# Patient Record
Sex: FEMALE | Race: WHITE | NOT HISPANIC OR LATINO
[De-identification: names, ages, dates, MRNs, and addresses within clinical notes are randomized per-mention and may not be internally consistent; named-entity substitution may affect disease eponyms.]

---
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
## 2021-06-19 ENCOUNTER — APPOINTMENT (OUTPATIENT)
Dept: DISASTER EMERGENCY | Facility: OTHER | Age: 21
End: 2021-06-19

## 2021-07-10 ENCOUNTER — APPOINTMENT (OUTPATIENT)
Dept: DISASTER EMERGENCY | Facility: OTHER | Age: 21
End: 2021-07-10

## 2021-08-10 ENCOUNTER — APPOINTMENT (OUTPATIENT)
Dept: DISASTER EMERGENCY | Facility: OTHER | Age: 21
End: 2021-08-10

## 2021-08-11 ENCOUNTER — TRANSCRIPTION ENCOUNTER (OUTPATIENT)
Age: 21
End: 2021-08-11

## 2021-12-15 ENCOUNTER — APPOINTMENT (OUTPATIENT)
Dept: OBGYN | Facility: CLINIC | Age: 21
End: 2021-12-15

## 2022-01-19 ENCOUNTER — TRANSCRIPTION ENCOUNTER (OUTPATIENT)
Age: 22
End: 2022-01-19

## 2022-06-15 ENCOUNTER — APPOINTMENT (OUTPATIENT)
Dept: OBGYN | Facility: CLINIC | Age: 22
End: 2022-06-15

## 2023-12-03 ENCOUNTER — NON-APPOINTMENT (OUTPATIENT)
Age: 23
End: 2023-12-03

## 2023-12-04 ENCOUNTER — INPATIENT (INPATIENT)
Facility: HOSPITAL | Age: 23
LOS: 3 days | Discharge: HOME | DRG: 866 | End: 2023-12-08
Attending: HOSPITALIST | Admitting: STUDENT IN AN ORGANIZED HEALTH CARE EDUCATION/TRAINING PROGRAM
Payer: COMMERCIAL

## 2023-12-04 VITALS
DIASTOLIC BLOOD PRESSURE: 80 MMHG | SYSTOLIC BLOOD PRESSURE: 122 MMHG | TEMPERATURE: 98 F | HEIGHT: 68 IN | OXYGEN SATURATION: 98 % | WEIGHT: 160.06 LBS | RESPIRATION RATE: 18 BRPM | HEART RATE: 96 BPM

## 2023-12-04 LAB
ALBUMIN SERPL ELPH-MCNC: 2.9 G/DL — LOW (ref 3.3–5)
ALBUMIN SERPL ELPH-MCNC: 2.9 G/DL — LOW (ref 3.3–5)
ALP SERPL-CCNC: 459 U/L — HIGH (ref 40–120)
ALP SERPL-CCNC: 459 U/L — HIGH (ref 40–120)
ALT FLD-CCNC: 834 U/L — HIGH (ref 12–78)
ALT FLD-CCNC: 834 U/L — HIGH (ref 12–78)
ANION GAP SERPL CALC-SCNC: 10 MMOL/L — SIGNIFICANT CHANGE UP (ref 5–17)
ANION GAP SERPL CALC-SCNC: 10 MMOL/L — SIGNIFICANT CHANGE UP (ref 5–17)
AST SERPL-CCNC: 749 U/L — HIGH (ref 15–37)
AST SERPL-CCNC: 749 U/L — HIGH (ref 15–37)
BILIRUB SERPL-MCNC: 6.9 MG/DL — HIGH (ref 0.2–1.2)
BILIRUB SERPL-MCNC: 6.9 MG/DL — HIGH (ref 0.2–1.2)
BUN SERPL-MCNC: 10 MG/DL — SIGNIFICANT CHANGE UP (ref 7–23)
BUN SERPL-MCNC: 10 MG/DL — SIGNIFICANT CHANGE UP (ref 7–23)
CALCIUM SERPL-MCNC: 8.1 MG/DL — LOW (ref 8.5–10.1)
CALCIUM SERPL-MCNC: 8.1 MG/DL — LOW (ref 8.5–10.1)
CHLORIDE SERPL-SCNC: 105 MMOL/L — SIGNIFICANT CHANGE UP (ref 96–108)
CHLORIDE SERPL-SCNC: 105 MMOL/L — SIGNIFICANT CHANGE UP (ref 96–108)
CO2 SERPL-SCNC: 28 MMOL/L — SIGNIFICANT CHANGE UP (ref 22–31)
CO2 SERPL-SCNC: 28 MMOL/L — SIGNIFICANT CHANGE UP (ref 22–31)
CREAT SERPL-MCNC: 0.65 MG/DL — SIGNIFICANT CHANGE UP (ref 0.5–1.3)
CREAT SERPL-MCNC: 0.65 MG/DL — SIGNIFICANT CHANGE UP (ref 0.5–1.3)
EGFR: 128 ML/MIN/1.73M2 — SIGNIFICANT CHANGE UP
EGFR: 128 ML/MIN/1.73M2 — SIGNIFICANT CHANGE UP
GLUCOSE SERPL-MCNC: 88 MG/DL — SIGNIFICANT CHANGE UP (ref 70–99)
GLUCOSE SERPL-MCNC: 88 MG/DL — SIGNIFICANT CHANGE UP (ref 70–99)
HCG SERPL-ACNC: <1 MIU/ML — SIGNIFICANT CHANGE UP
HCG SERPL-ACNC: <1 MIU/ML — SIGNIFICANT CHANGE UP
HCT VFR BLD CALC: 36.2 % — SIGNIFICANT CHANGE UP (ref 34.5–45)
HCT VFR BLD CALC: 36.2 % — SIGNIFICANT CHANGE UP (ref 34.5–45)
HGB BLD-MCNC: 12.3 G/DL — SIGNIFICANT CHANGE UP (ref 11.5–15.5)
HGB BLD-MCNC: 12.3 G/DL — SIGNIFICANT CHANGE UP (ref 11.5–15.5)
LACTATE SERPL-SCNC: 1.1 MMOL/L — SIGNIFICANT CHANGE UP (ref 0.7–2)
LACTATE SERPL-SCNC: 1.1 MMOL/L — SIGNIFICANT CHANGE UP (ref 0.7–2)
LIDOCAIN IGE QN: 43 U/L — SIGNIFICANT CHANGE UP (ref 13–75)
LIDOCAIN IGE QN: 43 U/L — SIGNIFICANT CHANGE UP (ref 13–75)
MCHC RBC-ENTMCNC: 28.6 PG — SIGNIFICANT CHANGE UP (ref 27–34)
MCHC RBC-ENTMCNC: 28.6 PG — SIGNIFICANT CHANGE UP (ref 27–34)
MCHC RBC-ENTMCNC: 34 GM/DL — SIGNIFICANT CHANGE UP (ref 32–36)
MCHC RBC-ENTMCNC: 34 GM/DL — SIGNIFICANT CHANGE UP (ref 32–36)
MCV RBC AUTO: 84.2 FL — SIGNIFICANT CHANGE UP (ref 80–100)
MCV RBC AUTO: 84.2 FL — SIGNIFICANT CHANGE UP (ref 80–100)
POTASSIUM SERPL-MCNC: 3.3 MMOL/L — LOW (ref 3.5–5.3)
POTASSIUM SERPL-MCNC: 3.3 MMOL/L — LOW (ref 3.5–5.3)
POTASSIUM SERPL-SCNC: 3.3 MMOL/L — LOW (ref 3.5–5.3)
POTASSIUM SERPL-SCNC: 3.3 MMOL/L — LOW (ref 3.5–5.3)
PROT SERPL-MCNC: 7.1 G/DL — SIGNIFICANT CHANGE UP (ref 6–8.3)
PROT SERPL-MCNC: 7.1 G/DL — SIGNIFICANT CHANGE UP (ref 6–8.3)
RBC # BLD: 4.3 M/UL — SIGNIFICANT CHANGE UP (ref 3.8–5.2)
RBC # BLD: 4.3 M/UL — SIGNIFICANT CHANGE UP (ref 3.8–5.2)
RBC # FLD: 12.8 % — SIGNIFICANT CHANGE UP (ref 10.3–14.5)
RBC # FLD: 12.8 % — SIGNIFICANT CHANGE UP (ref 10.3–14.5)
SODIUM SERPL-SCNC: 143 MMOL/L — SIGNIFICANT CHANGE UP (ref 135–145)
SODIUM SERPL-SCNC: 143 MMOL/L — SIGNIFICANT CHANGE UP (ref 135–145)
TROPONIN I, HIGH SENSITIVITY RESULT: 5 NG/L — SIGNIFICANT CHANGE UP
TROPONIN I, HIGH SENSITIVITY RESULT: 5 NG/L — SIGNIFICANT CHANGE UP
WBC # BLD: 12.35 K/UL — HIGH (ref 3.8–10.5)
WBC # BLD: 12.35 K/UL — HIGH (ref 3.8–10.5)
WBC # FLD AUTO: 12.35 K/UL — HIGH (ref 3.8–10.5)
WBC # FLD AUTO: 12.35 K/UL — HIGH (ref 3.8–10.5)

## 2023-12-04 PROCEDURE — 76705 ECHO EXAM OF ABDOMEN: CPT | Mod: 26

## 2023-12-04 PROCEDURE — 74177 CT ABD & PELVIS W/CONTRAST: CPT | Mod: 26,MA

## 2023-12-04 PROCEDURE — 99285 EMERGENCY DEPT VISIT HI MDM: CPT

## 2023-12-04 RX ORDER — SODIUM CHLORIDE 9 MG/ML
1000 INJECTION INTRAMUSCULAR; INTRAVENOUS; SUBCUTANEOUS ONCE
Refills: 0 | Status: COMPLETED | OUTPATIENT
Start: 2023-12-04 | End: 2023-12-04

## 2023-12-04 RX ORDER — MORPHINE SULFATE 50 MG/1
2 CAPSULE, EXTENDED RELEASE ORAL ONCE
Refills: 0 | Status: DISCONTINUED | OUTPATIENT
Start: 2023-12-04 | End: 2023-12-04

## 2023-12-04 RX ORDER — ONDANSETRON 8 MG/1
4 TABLET, FILM COATED ORAL ONCE
Refills: 0 | Status: COMPLETED | OUTPATIENT
Start: 2023-12-04 | End: 2023-12-04

## 2023-12-04 RX ORDER — FAMOTIDINE 10 MG/ML
20 INJECTION INTRAVENOUS ONCE
Refills: 0 | Status: COMPLETED | OUTPATIENT
Start: 2023-12-04 | End: 2023-12-04

## 2023-12-04 RX ORDER — POTASSIUM CHLORIDE 20 MEQ
40 PACKET (EA) ORAL ONCE
Refills: 0 | Status: COMPLETED | OUTPATIENT
Start: 2023-12-04 | End: 2023-12-04

## 2023-12-04 RX ADMIN — Medication 40 MILLIEQUIVALENT(S): at 23:02

## 2023-12-04 RX ADMIN — FAMOTIDINE 20 MILLIGRAM(S): 10 INJECTION INTRAVENOUS at 22:20

## 2023-12-04 RX ADMIN — SODIUM CHLORIDE 1000 MILLILITER(S): 9 INJECTION INTRAMUSCULAR; INTRAVENOUS; SUBCUTANEOUS at 22:20

## 2023-12-04 RX ADMIN — ONDANSETRON 4 MILLIGRAM(S): 8 TABLET, FILM COATED ORAL at 22:20

## 2023-12-04 RX ADMIN — SODIUM CHLORIDE 1000 MILLILITER(S): 9 INJECTION INTRAMUSCULAR; INTRAVENOUS; SUBCUTANEOUS at 22:03

## 2023-12-04 NOTE — ED PROVIDER NOTE - CLINICAL SUMMARY MEDICAL DECISION MAKING FREE TEXT BOX
24-year-old female with no medical surgical history with 5 days of URI symptoms afebrile for the last 48 hours and yesterday started with epigastric discomfort which has been constant today.  Had 1 episode of vomiting started but not today.  No he went to urgent care and they noticed the patient was jaundiced so they sent her to the ED.  Patient with mildly icteric sclera but abdomen soft nontender.  Will evaluate for biliary colic, gallstones, liver disease versus familial.  We will get labs, ultrasound, CT abdomen and pelvis.  Will give IV fluids, IV morphine and Zofran and IV Pepcid.  Will reassess.

## 2023-12-04 NOTE — ED ADULT NURSE NOTE - NSFALLUNIVINTERV_ED_ALL_ED
Bed/Stretcher in lowest position, wheels locked, appropriate side rails in place/Call bell, personal items and telephone in reach/Instruct patient to call for assistance before getting out of bed/chair/stretcher/Non-slip footwear applied when patient is off stretcher/[place] to call system/Physically safe environment - no spills, clutter or unnecessary equipment/Purposeful proactive rounding/Room/bathroom lighting operational, light cord in reach Bed/Stretcher in lowest position, wheels locked, appropriate side rails in place/Call bell, personal items and telephone in reach/Instruct patient to call for assistance before getting out of bed/chair/stretcher/Non-slip footwear applied when patient is off stretcher/[place] to call system/Physically safe environment - no spills, clutter or unnecessary equipment/Purposeful proactive rounding/Room/bathroom lighting operational, light cord in reach

## 2023-12-04 NOTE — ED ADULT NURSE NOTE - CHIEF COMPLAINT QUOTE
Pt states cold symptoms, fever x 1 week, yesterday began vomiting with epigastric abdominal pain. Sent from  for eval, pt is noted with jaundice to eyes. no abdominal pain, no bloating, no constipation, no diarrhea, no nausea and no vomiting.

## 2023-12-04 NOTE — ED ADULT TRIAGE NOTE - CHIEF COMPLAINT QUOTE
Pt states cold symptoms, fever x 1 week, yesterday began vomiting with epigastric abdominal pain. Sent from  for eval, pt is noted with jaundice to eyes.

## 2023-12-04 NOTE — ED PROVIDER NOTE - PROGRESS NOTE DETAILS
LFTs markedly elevated  Pt reports pain is better, drinking water  CT and US results no acute pathology  All results were discussed with pt and her parents at bedside.  Advised admission for further workup.   Pt does not have a PCP at this time. Case d/w Dr. Ortiz for admission

## 2023-12-04 NOTE — ED PROVIDER NOTE - OBJECTIVE STATEMENT
23-year-old female with no medical or surgical history is presenting with for epigastric pain and jaundice.  Patient reports she has had fever for 3 days started last week Thursday, Tmax 102 with URI symptoms.  She has been afebrile for the last 48 hours.  Reports 1 episode of nausea and vomiting yesterday.  States epigastric pain.  She started yesterday and more constant today with decreased appetite.  Last bowel movement was yesterday.  Family history significant for father with Gilbert's disease.  Patient reports she has been taking ibuprofen, Advil Cold and Sinus, Mucinex as needed for her symptoms.

## 2023-12-05 DIAGNOSIS — E87.8 OTHER DISORDERS OF ELECTROLYTE AND FLUID BALANCE, NOT ELSEWHERE CLASSIFIED: ICD-10-CM

## 2023-12-05 DIAGNOSIS — R79.89 OTHER SPECIFIED ABNORMAL FINDINGS OF BLOOD CHEMISTRY: ICD-10-CM

## 2023-12-05 DIAGNOSIS — B34.9 VIRAL INFECTION, UNSPECIFIED: ICD-10-CM

## 2023-12-05 DIAGNOSIS — B34.8 OTHER VIRAL INFECTIONS OF UNSPECIFIED SITE: ICD-10-CM

## 2023-12-05 DIAGNOSIS — Z29.9 ENCOUNTER FOR PROPHYLACTIC MEASURES, UNSPECIFIED: ICD-10-CM

## 2023-12-05 DIAGNOSIS — D69.6 THROMBOCYTOPENIA, UNSPECIFIED: ICD-10-CM

## 2023-12-05 DIAGNOSIS — R74.01 ELEVATION OF LEVELS OF LIVER TRANSAMINASE LEVELS: ICD-10-CM

## 2023-12-05 LAB
ALBUMIN SERPL ELPH-MCNC: 2.5 G/DL — LOW (ref 3.3–5)
ALBUMIN SERPL ELPH-MCNC: 2.5 G/DL — LOW (ref 3.3–5)
ALP SERPL-CCNC: 420 U/L — HIGH (ref 40–120)
ALP SERPL-CCNC: 420 U/L — HIGH (ref 40–120)
ALT FLD-CCNC: 765 U/L — HIGH (ref 12–78)
ALT FLD-CCNC: 765 U/L — HIGH (ref 12–78)
ANION GAP SERPL CALC-SCNC: 6 MMOL/L — SIGNIFICANT CHANGE UP (ref 5–17)
ANION GAP SERPL CALC-SCNC: 6 MMOL/L — SIGNIFICANT CHANGE UP (ref 5–17)
APPEARANCE UR: CLEAR — SIGNIFICANT CHANGE UP
APPEARANCE UR: CLEAR — SIGNIFICANT CHANGE UP
AST SERPL-CCNC: 706 U/L — HIGH (ref 15–37)
AST SERPL-CCNC: 706 U/L — HIGH (ref 15–37)
B BURGDOR C6 AB SER-ACNC: NEGATIVE — SIGNIFICANT CHANGE UP
B BURGDOR C6 AB SER-ACNC: NEGATIVE — SIGNIFICANT CHANGE UP
B BURGDOR IGG+IGM SER-ACNC: 0.34 INDEX — SIGNIFICANT CHANGE UP (ref 0.01–0.89)
B BURGDOR IGG+IGM SER-ACNC: 0.34 INDEX — SIGNIFICANT CHANGE UP (ref 0.01–0.89)
BABESIA MICROTI PCR, BLD RESULT: SIGNIFICANT CHANGE UP
BABESIA MICROTI PCR, BLD RESULT: SIGNIFICANT CHANGE UP
BASOPHILS # BLD AUTO: 0 K/UL — SIGNIFICANT CHANGE UP (ref 0–0.2)
BASOPHILS # BLD AUTO: 0 K/UL — SIGNIFICANT CHANGE UP (ref 0–0.2)
BASOPHILS # BLD AUTO: 0.05 K/UL — SIGNIFICANT CHANGE UP (ref 0–0.2)
BASOPHILS # BLD AUTO: 0.05 K/UL — SIGNIFICANT CHANGE UP (ref 0–0.2)
BASOPHILS NFR BLD AUTO: 0 % — SIGNIFICANT CHANGE UP (ref 0–2)
BASOPHILS NFR BLD AUTO: 0 % — SIGNIFICANT CHANGE UP (ref 0–2)
BASOPHILS NFR BLD AUTO: 0.4 % — SIGNIFICANT CHANGE UP (ref 0–2)
BASOPHILS NFR BLD AUTO: 0.4 % — SIGNIFICANT CHANGE UP (ref 0–2)
BILIRUB DIRECT SERPL-MCNC: 5.1 MG/DL — HIGH (ref 0–0.3)
BILIRUB DIRECT SERPL-MCNC: 5.1 MG/DL — HIGH (ref 0–0.3)
BILIRUB INDIRECT FLD-MCNC: 0.9 MG/DL — SIGNIFICANT CHANGE UP (ref 0.2–1)
BILIRUB INDIRECT FLD-MCNC: 0.9 MG/DL — SIGNIFICANT CHANGE UP (ref 0.2–1)
BILIRUB SERPL-MCNC: 6 MG/DL — HIGH (ref 0.2–1.2)
BILIRUB UR-MCNC: ABNORMAL
BILIRUB UR-MCNC: ABNORMAL
BUN SERPL-MCNC: 8 MG/DL — SIGNIFICANT CHANGE UP (ref 7–23)
BUN SERPL-MCNC: 8 MG/DL — SIGNIFICANT CHANGE UP (ref 7–23)
CALCIUM SERPL-MCNC: 7.4 MG/DL — LOW (ref 8.5–10.1)
CALCIUM SERPL-MCNC: 7.4 MG/DL — LOW (ref 8.5–10.1)
CHLORIDE SERPL-SCNC: 113 MMOL/L — HIGH (ref 96–108)
CHLORIDE SERPL-SCNC: 113 MMOL/L — HIGH (ref 96–108)
CMV IGG FLD QL: <0.2 U/ML — SIGNIFICANT CHANGE UP
CMV IGG FLD QL: <0.2 U/ML — SIGNIFICANT CHANGE UP
CMV IGG SERPL-IMP: NEGATIVE — SIGNIFICANT CHANGE UP
CMV IGG SERPL-IMP: NEGATIVE — SIGNIFICANT CHANGE UP
CMV IGM FLD-ACNC: <8 AU/ML — SIGNIFICANT CHANGE UP
CMV IGM FLD-ACNC: <8 AU/ML — SIGNIFICANT CHANGE UP
CMV IGM SERPL QL: NEGATIVE — SIGNIFICANT CHANGE UP
CMV IGM SERPL QL: NEGATIVE — SIGNIFICANT CHANGE UP
CO2 SERPL-SCNC: 26 MMOL/L — SIGNIFICANT CHANGE UP (ref 22–31)
CO2 SERPL-SCNC: 26 MMOL/L — SIGNIFICANT CHANGE UP (ref 22–31)
COLOR SPEC: SIGNIFICANT CHANGE UP
COLOR SPEC: SIGNIFICANT CHANGE UP
CREAT SERPL-MCNC: 0.56 MG/DL — SIGNIFICANT CHANGE UP (ref 0.5–1.3)
CREAT SERPL-MCNC: 0.56 MG/DL — SIGNIFICANT CHANGE UP (ref 0.5–1.3)
DIFF PNL FLD: ABNORMAL
DIFF PNL FLD: ABNORMAL
EBV EA AB SER IA-ACNC: 17.9 U/ML — HIGH
EBV EA AB SER IA-ACNC: 17.9 U/ML — HIGH
EBV EA AB TITR SER IF: NEGATIVE — SIGNIFICANT CHANGE UP
EBV EA AB TITR SER IF: NEGATIVE — SIGNIFICANT CHANGE UP
EBV EA IGG SER-ACNC: POSITIVE
EBV EA IGG SER-ACNC: POSITIVE
EBV NA IGG SER IA-ACNC: <3 U/ML — SIGNIFICANT CHANGE UP
EBV NA IGG SER IA-ACNC: <3 U/ML — SIGNIFICANT CHANGE UP
EBV PATRN SPEC IB-IMP: SIGNIFICANT CHANGE UP
EBV PATRN SPEC IB-IMP: SIGNIFICANT CHANGE UP
EBV VCA IGG AVIDITY SER QL IA: NEGATIVE — SIGNIFICANT CHANGE UP
EBV VCA IGG AVIDITY SER QL IA: NEGATIVE — SIGNIFICANT CHANGE UP
EBV VCA IGM SER IA-ACNC: 10.7 U/ML — SIGNIFICANT CHANGE UP
EBV VCA IGM SER IA-ACNC: 10.7 U/ML — SIGNIFICANT CHANGE UP
EBV VCA IGM SER IA-ACNC: 56.4 U/ML — HIGH
EBV VCA IGM SER IA-ACNC: 56.4 U/ML — HIGH
EBV VCA IGM TITR FLD: POSITIVE
EBV VCA IGM TITR FLD: POSITIVE
EGFR: 132 ML/MIN/1.73M2 — SIGNIFICANT CHANGE UP
EGFR: 132 ML/MIN/1.73M2 — SIGNIFICANT CHANGE UP
EOSINOPHIL # BLD AUTO: 0 K/UL — SIGNIFICANT CHANGE UP (ref 0–0.5)
EOSINOPHIL # BLD AUTO: 0 K/UL — SIGNIFICANT CHANGE UP (ref 0–0.5)
EOSINOPHIL # BLD AUTO: 0.03 K/UL — SIGNIFICANT CHANGE UP (ref 0–0.5)
EOSINOPHIL # BLD AUTO: 0.03 K/UL — SIGNIFICANT CHANGE UP (ref 0–0.5)
EOSINOPHIL NFR BLD AUTO: 0 % — SIGNIFICANT CHANGE UP (ref 0–6)
EOSINOPHIL NFR BLD AUTO: 0 % — SIGNIFICANT CHANGE UP (ref 0–6)
EOSINOPHIL NFR BLD AUTO: 0.3 % — SIGNIFICANT CHANGE UP (ref 0–6)
EOSINOPHIL NFR BLD AUTO: 0.3 % — SIGNIFICANT CHANGE UP (ref 0–6)
FLUAV AG NPH QL: SIGNIFICANT CHANGE UP
FLUAV AG NPH QL: SIGNIFICANT CHANGE UP
FLUBV AG NPH QL: SIGNIFICANT CHANGE UP
FLUBV AG NPH QL: SIGNIFICANT CHANGE UP
GLUCOSE SERPL-MCNC: 88 MG/DL — SIGNIFICANT CHANGE UP (ref 70–99)
GLUCOSE SERPL-MCNC: 88 MG/DL — SIGNIFICANT CHANGE UP (ref 70–99)
GLUCOSE UR QL: NEGATIVE MG/DL — SIGNIFICANT CHANGE UP
GLUCOSE UR QL: NEGATIVE MG/DL — SIGNIFICANT CHANGE UP
HAV IGM SER-ACNC: SIGNIFICANT CHANGE UP
HAV IGM SER-ACNC: SIGNIFICANT CHANGE UP
HBV CORE IGM SER-ACNC: SIGNIFICANT CHANGE UP
HBV CORE IGM SER-ACNC: SIGNIFICANT CHANGE UP
HBV SURFACE AG SER-ACNC: SIGNIFICANT CHANGE UP
HBV SURFACE AG SER-ACNC: SIGNIFICANT CHANGE UP
HCT VFR BLD CALC: 31.7 % — LOW (ref 34.5–45)
HCT VFR BLD CALC: 31.7 % — LOW (ref 34.5–45)
HCV AB S/CO SERPL IA: 0.13 S/CO — SIGNIFICANT CHANGE UP (ref 0–0.99)
HCV AB S/CO SERPL IA: 0.13 S/CO — SIGNIFICANT CHANGE UP (ref 0–0.99)
HCV AB SERPL-IMP: SIGNIFICANT CHANGE UP
HCV AB SERPL-IMP: SIGNIFICANT CHANGE UP
HGB BLD-MCNC: 10.7 G/DL — LOW (ref 11.5–15.5)
HGB BLD-MCNC: 10.7 G/DL — LOW (ref 11.5–15.5)
HIV 1+2 AB+HIV1 P24 AG SERPL QL IA: SIGNIFICANT CHANGE UP
HIV 1+2 AB+HIV1 P24 AG SERPL QL IA: SIGNIFICANT CHANGE UP
HPIV1 RNA SPEC QL NAA+PROBE: DETECTED
HPIV1 RNA SPEC QL NAA+PROBE: DETECTED
IMM GRANULOCYTES NFR BLD AUTO: 0.7 % — SIGNIFICANT CHANGE UP (ref 0–0.9)
IMM GRANULOCYTES NFR BLD AUTO: 0.7 % — SIGNIFICANT CHANGE UP (ref 0–0.9)
KETONES UR-MCNC: ABNORMAL MG/DL
KETONES UR-MCNC: ABNORMAL MG/DL
LEUKOCYTE ESTERASE UR-ACNC: NEGATIVE — SIGNIFICANT CHANGE UP
LEUKOCYTE ESTERASE UR-ACNC: NEGATIVE — SIGNIFICANT CHANGE UP
LYMPHOCYTES # BLD AUTO: 76 % — HIGH (ref 13–44)
LYMPHOCYTES # BLD AUTO: 76 % — HIGH (ref 13–44)
LYMPHOCYTES # BLD AUTO: 80.9 % — HIGH (ref 13–44)
LYMPHOCYTES # BLD AUTO: 80.9 % — HIGH (ref 13–44)
LYMPHOCYTES # BLD AUTO: 9.05 K/UL — HIGH (ref 1–3.3)
LYMPHOCYTES # BLD AUTO: 9.05 K/UL — HIGH (ref 1–3.3)
LYMPHOCYTES # BLD AUTO: 9.39 K/UL — HIGH (ref 1–3.3)
LYMPHOCYTES # BLD AUTO: 9.39 K/UL — HIGH (ref 1–3.3)
MCHC RBC-ENTMCNC: 28.7 PG — SIGNIFICANT CHANGE UP (ref 27–34)
MCHC RBC-ENTMCNC: 28.7 PG — SIGNIFICANT CHANGE UP (ref 27–34)
MCHC RBC-ENTMCNC: 33.8 GM/DL — SIGNIFICANT CHANGE UP (ref 32–36)
MCHC RBC-ENTMCNC: 33.8 GM/DL — SIGNIFICANT CHANGE UP (ref 32–36)
MCV RBC AUTO: 85 FL — SIGNIFICANT CHANGE UP (ref 80–100)
MCV RBC AUTO: 85 FL — SIGNIFICANT CHANGE UP (ref 80–100)
MONOCYTES # BLD AUTO: 0.35 K/UL — SIGNIFICANT CHANGE UP (ref 0–0.9)
MONOCYTES # BLD AUTO: 0.35 K/UL — SIGNIFICANT CHANGE UP (ref 0–0.9)
MONOCYTES # BLD AUTO: 0.37 K/UL — SIGNIFICANT CHANGE UP (ref 0–0.9)
MONOCYTES # BLD AUTO: 0.37 K/UL — SIGNIFICANT CHANGE UP (ref 0–0.9)
MONOCYTES NFR BLD AUTO: 3 % — SIGNIFICANT CHANGE UP (ref 2–14)
MONOCYTES NFR BLD AUTO: 3 % — SIGNIFICANT CHANGE UP (ref 2–14)
MONOCYTES NFR BLD AUTO: 3.1 % — SIGNIFICANT CHANGE UP (ref 2–14)
MONOCYTES NFR BLD AUTO: 3.1 % — SIGNIFICANT CHANGE UP (ref 2–14)
NEUTROPHILS # BLD AUTO: 1.36 K/UL — LOW (ref 1.8–7.4)
NEUTROPHILS # BLD AUTO: 1.36 K/UL — LOW (ref 1.8–7.4)
NEUTROPHILS # BLD AUTO: 1.62 K/UL — LOW (ref 1.8–7.4)
NEUTROPHILS # BLD AUTO: 1.62 K/UL — LOW (ref 1.8–7.4)
NEUTROPHILS NFR BLD AUTO: 10 % — LOW (ref 43–77)
NEUTROPHILS NFR BLD AUTO: 10 % — LOW (ref 43–77)
NEUTROPHILS NFR BLD AUTO: 14.6 % — LOW (ref 43–77)
NEUTROPHILS NFR BLD AUTO: 14.6 % — LOW (ref 43–77)
NITRITE UR-MCNC: NEGATIVE — SIGNIFICANT CHANGE UP
NITRITE UR-MCNC: NEGATIVE — SIGNIFICANT CHANGE UP
NRBC # BLD: 0 /100 WBCS — SIGNIFICANT CHANGE UP (ref 0–0)
NRBC # BLD: 0 /100 WBCS — SIGNIFICANT CHANGE UP (ref 0–0)
NRBC # BLD: SIGNIFICANT CHANGE UP /100 WBCS (ref 0–0)
NRBC # BLD: SIGNIFICANT CHANGE UP /100 WBCS (ref 0–0)
PH UR: 6.5 — SIGNIFICANT CHANGE UP (ref 5–8)
PH UR: 6.5 — SIGNIFICANT CHANGE UP (ref 5–8)
PLATELET # BLD AUTO: 77 K/UL — LOW (ref 150–400)
PLATELET # BLD AUTO: 77 K/UL — LOW (ref 150–400)
PLATELET # BLD AUTO: 90 K/UL — LOW (ref 150–400)
PLATELET # BLD AUTO: 90 K/UL — LOW (ref 150–400)
POTASSIUM SERPL-MCNC: 3.9 MMOL/L — SIGNIFICANT CHANGE UP (ref 3.5–5.3)
POTASSIUM SERPL-MCNC: 3.9 MMOL/L — SIGNIFICANT CHANGE UP (ref 3.5–5.3)
POTASSIUM SERPL-SCNC: 3.9 MMOL/L — SIGNIFICANT CHANGE UP (ref 3.5–5.3)
POTASSIUM SERPL-SCNC: 3.9 MMOL/L — SIGNIFICANT CHANGE UP (ref 3.5–5.3)
PROT SERPL-MCNC: 6 G/DL — SIGNIFICANT CHANGE UP (ref 6–8.3)
PROT SERPL-MCNC: 6 G/DL — SIGNIFICANT CHANGE UP (ref 6–8.3)
PROT UR-MCNC: SIGNIFICANT CHANGE UP MG/DL
PROT UR-MCNC: SIGNIFICANT CHANGE UP MG/DL
RAPID RVP RESULT: DETECTED
RAPID RVP RESULT: DETECTED
RBC # BLD: 3.73 M/UL — LOW (ref 3.8–5.2)
RBC # BLD: 3.73 M/UL — LOW (ref 3.8–5.2)
RBC # FLD: 12.9 % — SIGNIFICANT CHANGE UP (ref 10.3–14.5)
RBC # FLD: 12.9 % — SIGNIFICANT CHANGE UP (ref 10.3–14.5)
RSV RNA NPH QL NAA+NON-PROBE: SIGNIFICANT CHANGE UP
RSV RNA NPH QL NAA+NON-PROBE: SIGNIFICANT CHANGE UP
SARS-COV-2 RNA SPEC QL NAA+PROBE: SIGNIFICANT CHANGE UP
SODIUM SERPL-SCNC: 145 MMOL/L — SIGNIFICANT CHANGE UP (ref 135–145)
SODIUM SERPL-SCNC: 145 MMOL/L — SIGNIFICANT CHANGE UP (ref 135–145)
SP GR SPEC: 1.07 — HIGH (ref 1–1.03)
SP GR SPEC: 1.07 — HIGH (ref 1–1.03)
UROBILINOGEN FLD QL: 1 MG/DL — SIGNIFICANT CHANGE UP (ref 0.2–1)
UROBILINOGEN FLD QL: 1 MG/DL — SIGNIFICANT CHANGE UP (ref 0.2–1)
WBC # BLD: 11.18 K/UL — HIGH (ref 3.8–10.5)
WBC # BLD: 11.18 K/UL — HIGH (ref 3.8–10.5)
WBC # FLD AUTO: 11.18 K/UL — HIGH (ref 3.8–10.5)
WBC # FLD AUTO: 11.18 K/UL — HIGH (ref 3.8–10.5)

## 2023-12-05 PROCEDURE — 99233 SBSQ HOSP IP/OBS HIGH 50: CPT

## 2023-12-05 PROCEDURE — 99223 1ST HOSP IP/OBS HIGH 75: CPT | Mod: GC

## 2023-12-05 RX ORDER — ONDANSETRON 8 MG/1
4 TABLET, FILM COATED ORAL EVERY 8 HOURS
Refills: 0 | Status: DISCONTINUED | OUTPATIENT
Start: 2023-12-05 | End: 2023-12-08

## 2023-12-05 RX ORDER — LANOLIN ALCOHOL/MO/W.PET/CERES
3 CREAM (GRAM) TOPICAL AT BEDTIME
Refills: 0 | Status: DISCONTINUED | OUTPATIENT
Start: 2023-12-05 | End: 2023-12-08

## 2023-12-05 RX ORDER — ACETAMINOPHEN 500 MG
650 TABLET ORAL EVERY 6 HOURS
Refills: 0 | Status: DISCONTINUED | OUTPATIENT
Start: 2023-12-05 | End: 2023-12-07

## 2023-12-05 RX ORDER — URSODIOL 250 MG/1
300 TABLET, FILM COATED ORAL EVERY 12 HOURS
Refills: 0 | Status: DISCONTINUED | OUTPATIENT
Start: 2023-12-05 | End: 2023-12-08

## 2023-12-05 RX ORDER — SODIUM CHLORIDE 9 MG/ML
1000 INJECTION INTRAMUSCULAR; INTRAVENOUS; SUBCUTANEOUS
Refills: 0 | Status: DISCONTINUED | OUTPATIENT
Start: 2023-12-05 | End: 2023-12-08

## 2023-12-05 RX ADMIN — ONDANSETRON 4 MILLIGRAM(S): 8 TABLET, FILM COATED ORAL at 18:22

## 2023-12-05 RX ADMIN — SODIUM CHLORIDE 80 MILLILITER(S): 9 INJECTION INTRAMUSCULAR; INTRAVENOUS; SUBCUTANEOUS at 02:33

## 2023-12-05 RX ADMIN — URSODIOL 300 MILLIGRAM(S): 250 TABLET, FILM COATED ORAL at 18:20

## 2023-12-05 NOTE — H&P ADULT - NSHPREVIEWOFSYSTEMS_GEN_ALL_CORE
CONSTITUTIONAL: +headache denies fever, chills, fatigue, weakness  HEENT: +congestion, denies blurred vision, sore throat  SKIN: denies new lesions, rash  CARDIOVASCULAR: denies chest pain, chest pressure, palpitations  RESPIRATORY: denies shortness of breath, cough, sputum production  GASTROINTESTINAL: +nausea, +epigastric fullness denies vomiting, diarrhea, abdominal pain, melena or hematochezia  GENITOURINARY: +dark urine; denies dysuria, discharge  NEUROLOGICAL: denies numbness, headache, focal weakness  MUSCULOSKELETAL: denies new joint pain, muscle aches  HEMATOLOGIC: denies gross bleeding, bruising

## 2023-12-05 NOTE — PROGRESS NOTE ADULT - PROBLEM SELECTOR PLAN 1
Pt presents to ED for jaundice and epigastric fullness x1 day   - On admission, Tbili 6.9, direct bili elevation, Alk phos 459, ,    - CT abdomen and pelvis showed No acute CT findings in the abdomen or pelvis. Mild splenomegaly.  - RUQ US showed No cholelithiasis or sonographic evidence of acute cholecystitis. Question mild periportal hyperechogenicity.   - f/u hepatitis panel   - f/u EBV/CMV   - f/u HIV   - f/u lyme panel   - f/u autoimmune workup   - GI consulted; f/u recs Pt presents to ED for jaundice and epigastric fullness x1 day   - On admission, Tbili 6.9, direct bili elevation, Alk phos 459, ,  , slight down trending   - CT abdomen and pelvis showed No acute CT findings in the abdomen or pelvis. Mild splenomegaly.  - RUQ US showed No cholelithiasis or sonographic evidence of acute cholecystitis. Question mild periportal hyperechogenicity.   - f/u hepatitis panel   - f/u EBV/CMV   - f/u HIV   - f/u lyme panel   - f/u autoimmune workup   - GI consulted; f/u recs

## 2023-12-05 NOTE — H&P ADULT - PROBLEM SELECTOR PLAN 1
Pt presents to ED for jaundice and epigastric fullness x1 day   - On admission, Tbili 6.9, Alk phos 459, ,    - CT abdomen and pelvis showed No acute CT findings in the abdomen or pelvis. Mild splenomegaly.  - RUQ US showed No cholelithiasis or sonographic evidence of acute cholecystitis. Question mild periportal hyperechogenicity, which is nonspecific. Recommend clinical correlation and correlation with LFTs.  - s/p 1L NS bolus x2, pepcid 20mg x1, morphine 2mg x1, Zofran 4mg x1, KCl 40meq x1 in the ED  - f/u hepatitis panel   - f/u EBV/CMV   - f/u HIV   - f/u indirect and direct bili   - f/u lyme panel   - f/u autoimmune workup   - prn zofran   - GI, Dr. Gaming, consulted; f/u recs

## 2023-12-05 NOTE — H&P ADULT - NSHPSOCIALHISTORY_GEN_ALL_CORE
Tobacco: None  EtOH:  social   Recreational drug use: none  Lives with: parents  Ambulates: independent   ADLs: independent

## 2023-12-05 NOTE — CONSULT NOTE ADULT - SUBJECTIVE AND OBJECTIVE BOX
[facility], Division of Infectious Diseases  SIRISHA Cho S. Shah, ELKE Vera [facility]  [phone]    MICHELLE KAUR  23y, Female  [id number]    HPI--  HPI:  24 yo female with no PMHx presents to the ED for jaundice and epigastric fullness x1 day. Pt states that she has been experiencing URI symptoms for the last week. Elicits fever, headache, mild dizziness, cough, congestion, nausea, decreased PO intake.Tmax 102. Pt has been afebrile for 48 hours Pt states that she had one episode of diarrhea and vomiting yesterday (unknown if bilious; however, mother elicits no hematochezia). This AM pt went to urgent care. Sent to the hospital for further evaluation. Pt does not elicit any prior episode. Of note, father with hx of [place] disease.   ED course  Vitals: T 97.9, HR 96, /80, RR 18, SpO2 98% on RA  Significant labs: WBC 12.35, K 3.3, Tbili 6.9, Alk phos 459, ,    Imaging:   CT abdomen and pelvis showed No acute CT findings in the abdomen or pelvis. Mild splenomegaly.  RUQ US showed No cholelithiasis or sonographic evidence of acute cholecystitis. Question mild periportal hyperechogenicity, which is nonspecific. Recommend clinical correlation and correlation with LFTs.  In ED patient given: 1L NS bolus x2, pepcid 20mg x1, morphine 2mg x1, Zofran 4mg x1, KCl 40meq x1    (05 Dec 2023 00:39)    RVP + parainfluenzae  Pt seen at bedside    Active Medications--  acetaminophen     Tablet .. 650 milliGRAM(s) Oral every 6 hours PRN  aluminum hydroxide/magnesium hydroxide/simethicone Suspension 30 milliLiter(s) Oral every 4 hours PRN  melatonin 3 milliGRAM(s) Oral at bedtime PRN  ondansetron Injectable 4 milliGRAM(s) IV Push every 8 hours PRN  sodium chloride 0.9%. 1000 milliLiter(s) IV Continuous <Continuous>  ursodiol Capsule 300 milliGRAM(s) Oral every 12 hours    Antimicrobials:     Immunologic:     ROS:  as above    Allergies: sulfa drugs (Rash)  penicillin (Hives)    PMH -- No pertinent past medical history      PSH -- No significant past surgical history      FH -- FH: Gilbert's disease (Father)    FH: liver cancer (Grandparent)    FH: colon cancer (Grandparent)      Social History --  EtOH: denies   Tobacco: denies   Drug Use: denies     Travel/Environmental/Occupational History:    Physical Exam--  Vital Signs Last 24 Hrs  T(F): 98.7 (05 Dec 2023 04:48), Max: 98.7 (05 Dec 2023 04:48)  HR: 89 (05 Dec 2023 04:48) (89 - 96)  BP: 110/73 (05 Dec 2023 04:48) (110/71 - 122/80)  RR: 18 (05 Dec 2023 04:48) (18 - 18)  SpO2: 96% (05 Dec 2023 04:48) (96% - 98%)  General: nontoxic-appearing, no acute distress  HEENT: NC/AT, EOMI,   Lungs: symmetric chest rise  Heart: Regular rate and rhythm. No murmur, rub or gallop.  Abdomen: Soft. Nondistended. Nontender.   Extremities: No cyanosis or clubbing. No edema.   Skin:+jaundice    Laboratory & Imaging Data:  CBC:                       10.7   11.18 )-----------( 90       ( 05 Dec 2023 04:31 )             31.7     CMP: 12-05    145  |  113<H>  |  8   ----------------------------<  88  3.9   |  26  |  0.56    Ca    7.4<L>      05 Dec 2023 04:31    TPro  6.0  /  Alb  2.5<L>  /  TBili  6.0<H>  /  DBili  5.1<H>  /  AST  706<H>  /  ALT  765<H>  /  AlkPhos  420<H>  12-05    LIVER FUNCTIONS - ( 05 Dec 2023 04:31 )  Alb: 2.5 g/dL / Pro: 6.0 g/dL / ALK PHOS: 420 U/L / ALT: 765 U/L / AST: 706 U/L / GGT: x           Urinalysis Basic - ( 05 Dec 2023 04:31 )    Color: x / Appearance: x / SG: x / pH: x  Gluc: 88 mg/dL / Ketone: x  / Bili: x / Urobili: x   Blood: x / Protein: x / Nitrite: x   Leuk Esterase: x / RBC: x / WBC x   Sq Epi: x / Non Sq Epi: x / Bacteria: x        Microbiology: reviewed        Radiology: reviewed  < from: US Abdomen Upper Quadrant Right (12.04.23 @ 22:56) >    ACC: [id number] EXAM:  US ABDOMEN RT UPR QUADRANT   ORDERED BY:  GUDELIA MARISCAL     PROCEDURE DATE:  12/04/2023          INTERPRETATION:  CLINICAL INFORMATION: Abdominal pain, evaluate for   gallstones.    COMPARISON: 12/4/2023 CT    TECHNIQUE: Sonography of the right upper quadrant.    FINDINGS:  Liver: Normal size. Question mild periportal hyperechogenicity.  Bile ducts: Normal caliber. Common bile duct measures 4 mm.  Gallbladder: Within normal limits. No stones or gallbladder wall   thickening. No sonographic Bowens's sign.  Pancreas: Visualized portions are within normal limits.  Right kidney: 12.7 cm. No hydronephrosis.  Ascites: None.  IVC: Visualized portions are within normal limits.    IMPRESSION:  No cholelithiasis or sonographic evidence of acute cholecystitis.  Question mild periportal hyperechogenicity, which is nonspecific.   Recommend clinical correlation and correlation with LFTs.      --- End of Report ---            PORFIRIO STARR MD; Attending Radiologist  This document has been electronically signed. Dec  4 2023 11:26PM    < end of copied text >  < from: CT Abdomen and Pelvis w/ IV Cont (12.04.23 @ 22:35) >    ACC: [id number] EXAM:  CT ABDOMEN AND PELVIS IC   ORDERED BY:  GUDELIA MARISCAL     PROCEDURE DATE:  12/04/2023          INTERPRETATION:  CLINICAL INFORMATION: 23 years old. Female. epigastric   pain/jaundice.    COMPARISON: None.    CONTRAST/COMPLICATIONS:  IV Contrast: Omnipaque 350  90 cc administered  10 cc discarded.  Oral Contrast: NONE  Complications: None reported at time of study completion    PROCEDURE:  CT of the Abdomen and Pelvis was performed.  Sagittal and coronal reformats were performed.    FINDINGS:    LOWER CHEST: Within normal limits.    LIVER: Within normal limits.  BILE DUCTS: Normal caliber.  GALLBLADDER: Within normal limits.  SPLEEN: Mild splenomegaly.  PANCREAS: Within normal limits.  ADRENALS: Within normal limits.  KIDNEYS/URETERS: Within normal limits.    BLADDER: Within normal limits.  REPRODUCTIVE ORGANS: Uterus and adnexa are grossly unremarkable.    BOWEL: No bowel obstruction. Appendix is unremarkable.  PERITONEUM: No ascites.  VESSELS: Normal caliber abdominal aorta.  RETROPERITONEUM/LYMPH NODES: No lymphadenopathy.  ABDOMINAL WALL: Within normal limits.  BONES: Within normal limits. Thoracolumbar levoscoliosis.    IMPRESSION:    No acute CT findings in the abdomen or pelvis. Mild splenomegaly.    --- End of Report ---            JOSH PRADHAN MD; Attending Radiologist  This document has been electronically signed. Dec  4 2023 11:04PM    < end of copied text >   [facility], Division of Infectious Diseases  SIRISHA Cho S. Shah, ELKE Vera [facility]  [phone]    MICHELLE KAUR  23y, Female  [id number]    HPI--  HPI:  22 yo female with no PMHx presents to the ED for jaundice and epigastric fullness x1 day. Pt states that she has been experiencing URI symptoms for the last week. Elicits fever, headache, mild dizziness, cough, congestion, nausea, decreased PO intake.Tmax 102. Pt has been afebrile for 48 hours Pt states that she had one episode of diarrhea and vomiting yesterday (unknown if bilious; however, mother elicits no hematochezia). This AM pt went to urgent care. Sent to the hospital for further evaluation. Pt does not elicit any prior episode. Of note, father with hx of [place] disease.   ED course  Vitals: T 97.9, HR 96, /80, RR 18, SpO2 98% on RA  Significant labs: WBC 12.35, K 3.3, Tbili 6.9, Alk phos 459, ,    Imaging:   CT abdomen and pelvis showed No acute CT findings in the abdomen or pelvis. Mild splenomegaly.  RUQ US showed No cholelithiasis or sonographic evidence of acute cholecystitis. Question mild periportal hyperechogenicity, which is nonspecific. Recommend clinical correlation and correlation with LFTs.  In ED patient given: 1L NS bolus x2, pepcid 20mg x1, morphine 2mg x1, Zofran 4mg x1, KCl 40meq x1    (05 Dec 2023 00:39)    RVP + parainfluenzae  Pt seen at bedside    Active Medications--  acetaminophen     Tablet .. 650 milliGRAM(s) Oral every 6 hours PRN  aluminum hydroxide/magnesium hydroxide/simethicone Suspension 30 milliLiter(s) Oral every 4 hours PRN  melatonin 3 milliGRAM(s) Oral at bedtime PRN  ondansetron Injectable 4 milliGRAM(s) IV Push every 8 hours PRN  sodium chloride 0.9%. 1000 milliLiter(s) IV Continuous <Continuous>  ursodiol Capsule 300 milliGRAM(s) Oral every 12 hours    Antimicrobials:     Immunologic:     ROS:  as above    Allergies: sulfa drugs (Rash)  penicillin (Hives)    PMH -- No pertinent past medical history      PSH -- No significant past surgical history      FH -- FH: Gilbert's disease (Father)    FH: liver cancer (Grandparent)    FH: colon cancer (Grandparent)      Social History --  EtOH: denies   Tobacco: denies   Drug Use: denies     Travel/Environmental/Occupational History:    Physical Exam--  Vital Signs Last 24 Hrs  T(F): 98.7 (05 Dec 2023 04:48), Max: 98.7 (05 Dec 2023 04:48)  HR: 89 (05 Dec 2023 04:48) (89 - 96)  BP: 110/73 (05 Dec 2023 04:48) (110/71 - 122/80)  RR: 18 (05 Dec 2023 04:48) (18 - 18)  SpO2: 96% (05 Dec 2023 04:48) (96% - 98%)  General: nontoxic-appearing, no acute distress  HEENT: NC/AT, EOMI,   Lungs: symmetric chest rise  Heart: Regular rate and rhythm. No murmur, rub or gallop.  Abdomen: Soft. Nondistended. Nontender.   Extremities: No cyanosis or clubbing. No edema.   Skin:+jaundice    Laboratory & Imaging Data:  CBC:                       10.7   11.18 )-----------( 90       ( 05 Dec 2023 04:31 )             31.7     CMP: 12-05    145  |  113<H>  |  8   ----------------------------<  88  3.9   |  26  |  0.56    Ca    7.4<L>      05 Dec 2023 04:31    TPro  6.0  /  Alb  2.5<L>  /  TBili  6.0<H>  /  DBili  5.1<H>  /  AST  706<H>  /  ALT  765<H>  /  AlkPhos  420<H>  12-05    LIVER FUNCTIONS - ( 05 Dec 2023 04:31 )  Alb: 2.5 g/dL / Pro: 6.0 g/dL / ALK PHOS: 420 U/L / ALT: 765 U/L / AST: 706 U/L / GGT: x           Urinalysis Basic - ( 05 Dec 2023 04:31 )    Color: x / Appearance: x / SG: x / pH: x  Gluc: 88 mg/dL / Ketone: x  / Bili: x / Urobili: x   Blood: x / Protein: x / Nitrite: x   Leuk Esterase: x / RBC: x / WBC x   Sq Epi: x / Non Sq Epi: x / Bacteria: x        Microbiology: reviewed        Radiology: reviewed  < from: US Abdomen Upper Quadrant Right (12.04.23 @ 22:56) >    ACC: [id number] EXAM:  US ABDOMEN RT UPR QUADRANT   ORDERED BY:  GUDELIA MARISCAL     PROCEDURE DATE:  12/04/2023          INTERPRETATION:  CLINICAL INFORMATION: Abdominal pain, evaluate for   gallstones.    COMPARISON: 12/4/2023 CT    TECHNIQUE: Sonography of the right upper quadrant.    FINDINGS:  Liver: Normal size. Question mild periportal hyperechogenicity.  Bile ducts: Normal caliber. Common bile duct measures 4 mm.  Gallbladder: Within normal limits. No stones or gallbladder wall   thickening. No sonographic Bowens's sign.  Pancreas: Visualized portions are within normal limits.  Right kidney: 12.7 cm. No hydronephrosis.  Ascites: None.  IVC: Visualized portions are within normal limits.    IMPRESSION:  No cholelithiasis or sonographic evidence of acute cholecystitis.  Question mild periportal hyperechogenicity, which is nonspecific.   Recommend clinical correlation and correlation with LFTs.      --- End of Report ---            PORFIRIO STARR MD; Attending Radiologist  This document has been electronically signed. Dec  4 2023 11:26PM    < end of copied text >  < from: CT Abdomen and Pelvis w/ IV Cont (12.04.23 @ 22:35) >    ACC: [id number] EXAM:  CT ABDOMEN AND PELVIS IC   ORDERED BY:  GUDELIA MARISCAL     PROCEDURE DATE:  12/04/2023          INTERPRETATION:  CLINICAL INFORMATION: 23 years old. Female. epigastric   pain/jaundice.    COMPARISON: None.    CONTRAST/COMPLICATIONS:  IV Contrast: Omnipaque 350  90 cc administered  10 cc discarded.  Oral Contrast: NONE  Complications: None reported at time of study completion    PROCEDURE:  CT of the Abdomen and Pelvis was performed.  Sagittal and coronal reformats were performed.    FINDINGS:    LOWER CHEST: Within normal limits.    LIVER: Within normal limits.  BILE DUCTS: Normal caliber.  GALLBLADDER: Within normal limits.  SPLEEN: Mild splenomegaly.  PANCREAS: Within normal limits.  ADRENALS: Within normal limits.  KIDNEYS/URETERS: Within normal limits.    BLADDER: Within normal limits.  REPRODUCTIVE ORGANS: Uterus and adnexa are grossly unremarkable.    BOWEL: No bowel obstruction. Appendix is unremarkable.  PERITONEUM: No ascites.  VESSELS: Normal caliber abdominal aorta.  RETROPERITONEUM/LYMPH NODES: No lymphadenopathy.  ABDOMINAL WALL: Within normal limits.  BONES: Within normal limits. Thoracolumbar levoscoliosis.    IMPRESSION:    No acute CT findings in the abdomen or pelvis. Mild splenomegaly.    --- End of Report ---            JOSH PRADHAN MD; Attending Radiologist  This document has been electronically signed. Dec  4 2023 11:04PM    < end of copied text >

## 2023-12-05 NOTE — H&P ADULT - HISTORY OF PRESENT ILLNESS
Abdominal Pain, N/V/D 22 yo female with no PMHx presents to the ED for jaundice and epigastric fullness x1 day. Pt states that she has been experiencing URI symptoms for the last week. Elicits fever, headache, mild dizziness, cough, congestion, nausea, decreased PO intake.Tmax 102. Pt has been afebrile for 48 hours Pt states that she had one episode of diarrhea and vomiting yesterday (unknown if bilious; however, mother elicits no hematochezia). This AM pt went to urgent care. Sent to the hospital for further evaluation. Pt does not elicit any prior episode. Of note, father with hx of [place] disease.     ED course  Vitals: T 97.9, HR 96, /80, RR 18, SpO2 98% on RA  Significant labs: WBC 12.35, K 3.3, Tbili 6.9, Alk phos 459, ,    Imaging:   CT abdomen and pelvis showed No acute CT findings in the abdomen or pelvis. Mild splenomegaly.  RUQ US showed No cholelithiasis or sonographic evidence of acute cholecystitis. Question mild periportal hyperechogenicity, which is nonspecific. Recommend clinical correlation and correlation with LFTs.  In ED patient given: 1L NS bolus x2, pepcid 20mg x1, morphine 2mg x1, Zofran 4mg x1, KCl 40meq x1    22 yo female with no PMHx presents to the ED for jaundice and epigastric fullness x1 day. Pt states that she has been experiencing URI symptoms for the last week. Elicits fever, headache, mild dizziness, cough, congestion, nausea, decreased PO intake.Tmax 102. Pt has been afebrile for 48 hours Pt states that she had one episode of diarrhea and vomiting yesterday (unknown if bilious; however, mother elicits no hematochezia). This AM pt went to urgent care. Sent to the hospital for further evaluation. Pt does not elicit any prior episode. Of note, father with hx of [place] disease.     ED course  Vitals: T 97.9, HR 96, /80, RR 18, SpO2 98% on RA  Significant labs: WBC 12.35, K 3.3, Tbili 6.9, Alk phos 459, ,    Imaging:   CT abdomen and pelvis showed No acute CT findings in the abdomen or pelvis. Mild splenomegaly.  RUQ US showed No cholelithiasis or sonographic evidence of acute cholecystitis. Question mild periportal hyperechogenicity, which is nonspecific. Recommend clinical correlation and correlation with LFTs.  In ED patient given: 1L NS bolus x2, pepcid 20mg x1, morphine 2mg x1, Zofran 4mg x1, KCl 40meq x1    22 yo female with no PMHx presents to the ED for jaundice and epigastric fullness x1 day. Pt states that she has been experiencing URI symptoms for the last week. Elicits fever, headache, mild dizziness, cough, congestion, nausea, decreased PO intake.Tmax 102. Pt has been afebrile for 48 hours Pt states that she had one episode of diarrhea and vomiting yesterday (unknown if bilious; however, mother elicits no hematochezia). This AM pt went to urgent care. Sent to the hospital for further evaluation. Pt does not elicit any prior episode. Of note, father with hx of [place] disease.   ED course  Vitals: T 97.9, HR 96, /80, RR 18, SpO2 98% on RA  Significant labs: WBC 12.35, K 3.3, Tbili 6.9, Alk phos 459, ,    Imaging:   CT abdomen and pelvis showed No acute CT findings in the abdomen or pelvis. Mild splenomegaly.  RUQ US showed No cholelithiasis or sonographic evidence of acute cholecystitis. Question mild periportal hyperechogenicity, which is nonspecific. Recommend clinical correlation and correlation with LFTs.  In ED patient given: 1L NS bolus x2, pepcid 20mg x1, morphine 2mg x1, Zofran 4mg x1, KCl 40meq x1    24 yo female with no PMHx presents to the ED for jaundice and epigastric fullness x1 day. Pt states that she has been experiencing URI symptoms for the last week. Elicits fever, headache, mild dizziness, cough, congestion, nausea, decreased PO intake.Tmax 102. Pt has been afebrile for 48 hours Pt states that she had one episode of diarrhea and vomiting yesterday (unknown if bilious; however, mother elicits no hematochezia). This AM pt went to urgent care. Sent to the hospital for further evaluation. Pt does not elicit any prior episode. Of note, father with hx of [place] disease.   ED course  Vitals: T 97.9, HR 96, /80, RR 18, SpO2 98% on RA  Significant labs: WBC 12.35, K 3.3, Tbili 6.9, Alk phos 459, ,    Imaging:   CT abdomen and pelvis showed No acute CT findings in the abdomen or pelvis. Mild splenomegaly.  RUQ US showed No cholelithiasis or sonographic evidence of acute cholecystitis. Question mild periportal hyperechogenicity, which is nonspecific. Recommend clinical correlation and correlation with LFTs.  In ED patient given: 1L NS bolus x2, pepcid 20mg x1, morphine 2mg x1, Zofran 4mg x1, KCl 40meq x1

## 2023-12-05 NOTE — H&P ADULT - ASSESSMENT
24 yo female with no PMHx presents to the ED for jaundice and epigastric fullness x1 day. Admitted for elevated transaminases with associated jaundice.

## 2023-12-05 NOTE — H&P ADULT - NSICDXFAMILYHX_GEN_ALL_CORE_FT
FAMILY HISTORY:  Father  Still living? Unknown  FH: Gilbert's disease, Age at diagnosis: Age Unknown    Grandparent  Still living? Unknown  FH: colon cancer, Age at diagnosis: Age Unknown  FH: liver cancer, Age at diagnosis: Age Unknown

## 2023-12-05 NOTE — PATIENT PROFILE ADULT - FALL HARM RISK - UNIVERSAL INTERVENTIONS
Bed in lowest position, wheels locked, appropriate side rails in place/Call bell, personal items and telephone in reach/Instruct patient to call for assistance before getting out of bed or chair/Non-slip footwear when patient is out of bed/[place] to call system/Physically safe environment - no spills, clutter or unnecessary equipment/Purposeful Proactive Rounding/Room/bathroom lighting operational, light cord in reach Bed in lowest position, wheels locked, appropriate side rails in place/Call bell, personal items and telephone in reach/Instruct patient to call for assistance before getting out of bed or chair/Non-slip footwear when patient is out of bed/[place] to call system/Physically safe environment - no spills, clutter or unnecessary equipment/Purposeful Proactive Rounding/Room/bathroom lighting operational, light cord in reach

## 2023-12-05 NOTE — CONSULT NOTE ADULT - SUBJECTIVE AND OBJECTIVE BOX
Chief Complaint:  Patient is a 23y old  Female who presents with a chief complaint of Jaundice/Elevated LFTs now called to see pt for splenomegay has had a recent urti and found to have influenzae, here for follow up   jaundioce and weakness with apetite loss started a few weeks ago, started to get worse also. no hx of mono nop iovdanno prbc  no tatoos, on etoh    Allergies:  sulfa drugs (Rash)  penicillin (Hives)      Medications:  acetaminophen     Tablet .. 650 milliGRAM(s) Oral every 6 hours PRN  aluminum hydroxide/magnesium hydroxide/simethicone Suspension 30 milliLiter(s) Oral every 4 hours PRN  melatonin 3 milliGRAM(s) Oral at bedtime PRN  ondansetron Injectable 4 milliGRAM(s) IV Push every 8 hours PRN  sodium chloride 0.9%. 1000 milliLiter(s) IV Continuous <Continuous>  ursodiol Capsule 300 milliGRAM(s) Oral every 12 hours      PMHX/PSHX:  No pertinent past medical history    No significant past surgical history        Family history:  FH: Gilbert's disease (Father)    FH: liver cancer (Grandparent)    FH: colon cancer (Grandparent)        Social History: lives at home no etoh n0 cigs   no ivda no prbc no tatoos      ROS:     General:  No wt loss, fevers, chills, night sweats, fatigue,   Eyes:  Good vision, no reported pain  ENT:  No sore throat, pain, runny nose, dysphagia  CV:  No pain, palpitations, hypo/hypertension  Resp:  No dyspnea, cough, tachypnea, wheezing  GI:  No pain, No nausea, No vomiting, No diarrhea, No constipation, No weight loss, No fever, No pruritis, No rectal bleeding, No tarry stools, No dysphagia,  :  No pain, bleeding, incontinence, nocturia  Muscle:  No pain, weakness  Neuro:  No weakness, tingling, memory problems  Psych:  No fatigue, insomnia, mood problems, depression  Endocrine:  No polyuria, polydipsia, cold/heat intolerance  Heme:  No petechiae, ecchymosis, easy bruisability  Skin:  No rash, tattoos, scars, edema      PHYSICAL EXAM:   Vital Signs:  Vital Signs Last 24 Hrs  T(C): 37.4 (05 Dec 2023 12:56), Max: 37.4 (05 Dec 2023 12:56)  T(F): 99.4 (05 Dec 2023 12:56), Max: 99.4 (05 Dec 2023 12:56)  HR: 92 (05 Dec 2023 12:56) (89 - 96)  BP: 110/69 (05 Dec 2023 12:56) (110/69 - 122/80)  BP(mean): --  RR: 19 (05 Dec 2023 12:56) (18 - 19)  SpO2: 97% (05 Dec 2023 12:56) (96% - 98%)    Parameters below as of 05 Dec 2023 12:56  Patient On (Oxygen Delivery Method): room air      Daily Height in cm: 172.72 (04 Dec 2023 20:42)    Daily     GENERAL:  Appears stated age, well-groomed, well-nourished, no distress  HEENT:  NC/AT,  conjunctivae clear and pink, no thyromegaly, nodules, adenopathy, no JVD, sclera -anicteric  CHEST:  Full & symmetric excursion, no increased effort, breath sounds clear  HEART:  Regular rhythm, S1, S2, no murmur/rub/S3/S4, no abdominal bruit, no edema  ABDOMEN:  Soft, non-tender, non-distended, normoactive bowel sounds,  no masses ,no hepato-splenomegaly, no signs of chronic liver disease  EXTEREMITIES:  no cyanosis,clubbing or edema  SKIN:  No rash/erythema/ecchymoses/petechiae/wounds/abscess/warm/dry  NEURO:  Alert, oriented, no asterixis, no tremor, no encephalopathy    LABS:                        10.7   11.18 )-----------( 90       ( 05 Dec 2023 04:31 )             31.7     12-05    145  |  113<H>  |  8   ----------------------------<  88  3.9   |  26  |  0.56    Ca    7.4<L>      05 Dec 2023 04:31    TPro  6.0  /  Alb  2.5<L>  /  TBili  6.0<H>  /  DBili  5.1<H>  /  AST  706<H>  /  ALT  765<H>  /  AlkPhos  420<H>  12-05    LIVER FUNCTIONS - ( 05 Dec 2023 04:31 )  Alb: 2.5 g/dL / Pro: 6.0 g/dL / ALK PHOS: 420 U/L / ALT: 765 U/L / AST: 706 U/L / GGT: x             Urinalysis Basic - ( 05 Dec 2023 04:31 )    Color: x / Appearance: x / SG: x / pH: x  Gluc: 88 mg/dL / Ketone: x  / Bili: x / Urobili: x   Blood: x / Protein: x / Nitrite: x   Leuk Esterase: x / RBC: x / WBC x   Sq Epi: x / Non Sq Epi: x / Bacteria: x      Amylase Serum--      Lipase serum43       Ammonia--      Imaging:

## 2023-12-05 NOTE — H&P ADULT - ATTENDING COMMENTS
24 yo female with no PMHx presents to the ED for jaundice and epigastric fullness x1 day. Admitted for elevated transaminases with associated jaundice.     Agree with above. Edited where appropriate 22 yo female with no PMHx presents to the ED for jaundice and epigastric fullness x1 day. Admitted for elevated transaminases with associated jaundice.     Agree with above. Edited where appropriate

## 2023-12-05 NOTE — PROGRESS NOTE ADULT - SUBJECTIVE AND OBJECTIVE BOX
Patient is a 23y old  Female who presents with a chief complaint of Jaundice/Elevated LFTs (05 Dec 2023 00:39)      Subjective:  INTERVAL HPI/OVERNIGHT EVENTS: Patient seen and examined at bedside.  Patient states she is feeling better, but still has cough and nasal congestion   MEDICATIONS  (STANDING):  sodium chloride 0.9%. 1000 milliLiter(s) (80 mL/Hr) IV Continuous <Continuous>  ursodiol Capsule 300 milliGRAM(s) Oral every 12 hours    MEDICATIONS  (PRN):  acetaminophen     Tablet .. 650 milliGRAM(s) Oral every 6 hours PRN Temp greater or equal to 38C (100.4F), Mild Pain (1 - 3)  aluminum hydroxide/magnesium hydroxide/simethicone Suspension 30 milliLiter(s) Oral every 4 hours PRN Dyspepsia  melatonin 3 milliGRAM(s) Oral at bedtime PRN Insomnia  ondansetron Injectable 4 milliGRAM(s) IV Push every 8 hours PRN Nausea and/or Vomiting      Allergies    sulfa drugs (Rash)  penicillin (Hives)    Intolerances        REVIEW OF SYSTEMS:  CONSTITUTIONAL: No fever or chills  HEENT:  No headache, no sore throat  RESPIRATORY: cough   CARDIOVASCULAR: No chest pain or palpitations  GASTROINTESTINAL: No abd pain, nausea, vomiting, or diarrhea      Objective:  Vital Signs Last 24 Hrs  T(C): 37.1 (05 Dec 2023 04:48), Max: 37.1 (05 Dec 2023 04:48)  T(F): 98.7 (05 Dec 2023 04:48), Max: 98.7 (05 Dec 2023 04:48)  HR: 89 (05 Dec 2023 04:48) (89 - 96)  BP: 110/73 (05 Dec 2023 04:48) (110/71 - 122/80)  BP(mean): --  RR: 18 (05 Dec 2023 04:48) (18 - 18)  SpO2: 96% (05 Dec 2023 04:48) (96% - 98%)    Parameters below as of 05 Dec 2023 04:48  Patient On (Oxygen Delivery Method): room air        GENERAL: NAD, sitting in bed   HEAD:  Normocephalic  EYES:  conjunctiva and sclera  +Jaundice   ENT: Moist mucous membranes  NECK: Supple  CHEST/LUNG: Clear to auscultation bilaterally; No rales or rhonchi; no wheezing. Unlabored respirations  HEART: Regular rate and rhythm; S1S2+  ABDOMEN: Bowel sounds present; Soft, Nondistended, epigastric discomfort upon palpation.   EXTREMITIES:  + distal Peripheral Pulses;  No cyanosis, or edema  NERVOUS SYSTEM:  Alert & Oriented X3;  No gross focal deficits   MSK: moves all extremities  SKIN: No rashes     LABS:                        10.7   11.18 )-----------( 90       ( 05 Dec 2023 04:31 )             31.7     05 Dec 2023 04:31    145    |  113    |  8      ----------------------------<  88     3.9     |  26     |  0.56     Ca    7.4        05 Dec 2023 04:31    TPro  6.0    /  Alb  2.5    /  TBili  6.0    /  DBili  5.1    /  AST  706    /  ALT  765    /  AlkPhos  420    05 Dec 2023 04:31      Urinalysis Basic - ( 05 Dec 2023 04:31 )    Color: x / Appearance: x / SG: x / pH: x  Gluc: 88 mg/dL / Ketone: x  / Bili: x / Urobili: x   Blood: x / Protein: x / Nitrite: x   Leuk Esterase: x / RBC: x / WBC x   Sq Epi: x / Non Sq Epi: x / Bacteria: x      CAPILLARY BLOOD GLUCOSE              RADIOLOGY & ADDITIONAL TESTS:    Personally reviewed.     Consultant(s) Notes Reviewed:  [x] YES  [ ] NO    Plan of care discussed with patient /family: mother at bedside; all questions answered

## 2023-12-05 NOTE — H&P ADULT - PROBLEM SELECTOR PLAN 2
Pt with URI symptoms x1 week; Tmax 102; afebrile x48 hrs   - RVP neg in the ED   - Leukocytosis present; monitor CBC   - continue NS 80 cc/hr for 12 hours   - symptomatic management Pt with URI symptoms x1 week; Tmax 102; afebrile x48 hrs   - fu full rvp   - Leukocytosis present; monitor CBC   - continue NS 80 cc/hr for 12 hours   - symptomatic management

## 2023-12-05 NOTE — PROGRESS NOTE ADULT - ASSESSMENT
24 yo female with no PMHx presents to the ED for jaundice and epigastric fullness x1 day. Admitted for elevated transaminases with associated jaundice.    22 yo female with no PMHx presents to the ED for jaundice and epigastric fullness x1 day. Admitted for elevated transaminases with associated jaundice.

## 2023-12-05 NOTE — CONSULT NOTE ADULT - ASSESSMENT
22 yo female with no PMHx presents to the ED for jaundice and epigastric fullness x1 day. Admitted for elevated transaminases with associated jaundice.     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 Please call with any questions.   Shantell Vargas M.D.  [facility] Division of Infectious Diseases [phone]  For after 5 P.M. and weekends, please call [phone]   24 yo female with no PMHx presents to the ED for jaundice and epigastric fullness x1 day. Admitted for elevated transaminases with associated jaundice.     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 Please call with any questions.   Shantell Vargas M.D.  [facility] Division of Infectious Diseases [phone]  For after 5 P.M. and weekends, please call [phone]

## 2023-12-05 NOTE — CONSULT NOTE ADULT - ASSESSMENT
a/p inc lfts  ? mononucleosis    plan  check monospot  add ursodiol 300bid  supportive care   inc po intake  gerd precautions  d/w patioent and family

## 2023-12-05 NOTE — H&P ADULT - NSHPPHYSICALEXAM_GEN_ALL_CORE
T(C): 36.6 (12-04-23 @ 20:42), Max: 36.6 (12-04-23 @ 20:42)  HR: 96 (12-04-23 @ 20:42) (96 - 96)  BP: 122/80 (12-04-23 @ 20:42) (122/80 - 122/80)  RR: 18 (12-04-23 @ 20:42) (18 - 18)  SpO2: 98% (12-04-23 @ 20:42) (98% - 98%)    GENERAL: patient appears well, no acute distress, appropriately interactive  EYES: mildly icteric sclera, no exudates  ENMT: oropharynx clear without erythema, no exudates, moist mucous membranes  NECK: supple, soft  LUNGS: good air entry bilaterally, clear to auscultation, symmetric breath sounds, no wheezing or rhonchi appreciated  HEART: soft S1/S2, regular rate and rhythm, no murmurs noted, no lower extremity edema  GASTROINTESTINAL: abdomen is soft, +ttp in epigastric region, nondistended, normoactive bowel sounds  INTEGUMENT: good skin turgor, warm skin, appears well perfused  MUSCULOSKELETAL: no clubbing or cyanosis, no obvious deformity  NEUROLOGIC: awake, alert, oriented x3, good muscle tone in all 4 extremities  HEME/LYMPH: no obvious ecchymosis or petechiae T(C): 36.6 (12-04-23 @ 20:42), Max: 36.6 (12-04-23 @ 20:42)  HR: 96 (12-04-23 @ 20:42) (96 - 96)  BP: 122/80 (12-04-23 @ 20:42) (122/80 - 122/80)  RR: 18 (12-04-23 @ 20:42) (18 - 18)  SpO2: 98% (12-04-23 @ 20:42) (98% - 98%)  GENERAL: patient appears well, no acute distress, appropriately interactive  EYES: mildly icteric sclera, no exudates  ENMT: oropharynx clear without erythema, no exudates, moist mucous membranes  NECK: supple, soft  LUNGS: good air entry bilaterally, clear to auscultation, symmetric breath sounds, no wheezing or rhonchi appreciated  HEART: soft S1/S2, regular rate and rhythm, no murmurs noted, no lower extremity edema  GASTROINTESTINAL: abdomen is soft, +ttp in epigastric region, nondistended, normoactive bowel sounds  INTEGUMENT: good skin turgor, warm skin, appears well perfused  MUSCULOSKELETAL: no clubbing or cyanosis, no obvious deformity  NEUROLOGIC: awake, alert, oriented x3, good muscle tone in all 4 extremities  HEME/LYMPH: no obvious ecchymosis or petechiae

## 2023-12-06 LAB
ALBUMIN SERPL ELPH-MCNC: 2.5 G/DL — LOW (ref 3.3–5)
ALBUMIN SERPL ELPH-MCNC: 2.5 G/DL — LOW (ref 3.3–5)
ALP SERPL-CCNC: 583 U/L — HIGH (ref 40–120)
ALP SERPL-CCNC: 583 U/L — HIGH (ref 40–120)
ALT FLD-CCNC: 681 U/L — HIGH (ref 12–78)
ALT FLD-CCNC: 681 U/L — HIGH (ref 12–78)
ANION GAP SERPL CALC-SCNC: 7 MMOL/L — SIGNIFICANT CHANGE UP (ref 5–17)
ANION GAP SERPL CALC-SCNC: 7 MMOL/L — SIGNIFICANT CHANGE UP (ref 5–17)
AST SERPL-CCNC: 528 U/L — HIGH (ref 15–37)
AST SERPL-CCNC: 528 U/L — HIGH (ref 15–37)
B BURGDOR IGG+IGM SER QL IB: SIGNIFICANT CHANGE UP
B BURGDOR IGG+IGM SER QL IB: SIGNIFICANT CHANGE UP
BASOPHILS # BLD AUTO: 0.08 K/UL — SIGNIFICANT CHANGE UP (ref 0–0.2)
BASOPHILS # BLD AUTO: 0.08 K/UL — SIGNIFICANT CHANGE UP (ref 0–0.2)
BASOPHILS NFR BLD AUTO: 0.9 % — SIGNIFICANT CHANGE UP (ref 0–2)
BASOPHILS NFR BLD AUTO: 0.9 % — SIGNIFICANT CHANGE UP (ref 0–2)
BILIRUB DIRECT SERPL-MCNC: 6 MG/DL — HIGH (ref 0–0.3)
BILIRUB DIRECT SERPL-MCNC: 6 MG/DL — HIGH (ref 0–0.3)
BILIRUB INDIRECT FLD-MCNC: 1.2 MG/DL — HIGH (ref 0.2–1)
BILIRUB INDIRECT FLD-MCNC: 1.2 MG/DL — HIGH (ref 0.2–1)
BILIRUB SERPL-MCNC: 6.9 MG/DL — HIGH (ref 0.2–1.2)
BILIRUB SERPL-MCNC: 6.9 MG/DL — HIGH (ref 0.2–1.2)
BILIRUB SERPL-MCNC: 7.2 MG/DL — HIGH (ref 0.2–1.2)
BILIRUB SERPL-MCNC: 7.2 MG/DL — HIGH (ref 0.2–1.2)
BUN SERPL-MCNC: 6 MG/DL — LOW (ref 7–23)
BUN SERPL-MCNC: 6 MG/DL — LOW (ref 7–23)
CALCIUM SERPL-MCNC: 7.8 MG/DL — LOW (ref 8.5–10.1)
CALCIUM SERPL-MCNC: 7.8 MG/DL — LOW (ref 8.5–10.1)
CHLORIDE SERPL-SCNC: 105 MMOL/L — SIGNIFICANT CHANGE UP (ref 96–108)
CHLORIDE SERPL-SCNC: 105 MMOL/L — SIGNIFICANT CHANGE UP (ref 96–108)
CO2 SERPL-SCNC: 26 MMOL/L — SIGNIFICANT CHANGE UP (ref 22–31)
CO2 SERPL-SCNC: 26 MMOL/L — SIGNIFICANT CHANGE UP (ref 22–31)
CREAT SERPL-MCNC: 0.57 MG/DL — SIGNIFICANT CHANGE UP (ref 0.5–1.3)
CREAT SERPL-MCNC: 0.57 MG/DL — SIGNIFICANT CHANGE UP (ref 0.5–1.3)
CULTURE RESULTS: ABNORMAL
CULTURE RESULTS: ABNORMAL
DIR ANTIGLOB POLYSPECIFIC INTERPRETATION: SIGNIFICANT CHANGE UP
DIR ANTIGLOB POLYSPECIFIC INTERPRETATION: SIGNIFICANT CHANGE UP
EBV EA AB SER IA-ACNC: 27.6 U/ML — HIGH
EBV EA AB SER IA-ACNC: 27.6 U/ML — HIGH
EBV EA AB TITR SER IF: NEGATIVE — SIGNIFICANT CHANGE UP
EBV EA AB TITR SER IF: NEGATIVE — SIGNIFICANT CHANGE UP
EBV EA IGG SER-ACNC: POSITIVE
EBV EA IGG SER-ACNC: POSITIVE
EBV NA IGG SER IA-ACNC: <3 U/ML — SIGNIFICANT CHANGE UP
EBV NA IGG SER IA-ACNC: <3 U/ML — SIGNIFICANT CHANGE UP
EBV PATRN SPEC IB-IMP: SIGNIFICANT CHANGE UP
EBV PATRN SPEC IB-IMP: SIGNIFICANT CHANGE UP
EBV VCA IGG AVIDITY SER QL IA: NEGATIVE — SIGNIFICANT CHANGE UP
EBV VCA IGG AVIDITY SER QL IA: NEGATIVE — SIGNIFICANT CHANGE UP
EBV VCA IGM SER IA-ACNC: 17.3 U/ML — SIGNIFICANT CHANGE UP
EBV VCA IGM SER IA-ACNC: 17.3 U/ML — SIGNIFICANT CHANGE UP
EBV VCA IGM SER IA-ACNC: 55.9 U/ML — HIGH
EBV VCA IGM SER IA-ACNC: 55.9 U/ML — HIGH
EBV VCA IGM TITR FLD: POSITIVE
EBV VCA IGM TITR FLD: POSITIVE
EGFR: 132 ML/MIN/1.73M2 — SIGNIFICANT CHANGE UP
EGFR: 132 ML/MIN/1.73M2 — SIGNIFICANT CHANGE UP
EOSINOPHIL # BLD AUTO: 0.06 K/UL — SIGNIFICANT CHANGE UP (ref 0–0.5)
EOSINOPHIL # BLD AUTO: 0.06 K/UL — SIGNIFICANT CHANGE UP (ref 0–0.5)
EOSINOPHIL NFR BLD AUTO: 0.7 % — SIGNIFICANT CHANGE UP (ref 0–6)
EOSINOPHIL NFR BLD AUTO: 0.7 % — SIGNIFICANT CHANGE UP (ref 0–6)
ERYTHROCYTE [SEDIMENTATION RATE] IN BLOOD: 14 MM/HR — SIGNIFICANT CHANGE UP (ref 0–15)
ERYTHROCYTE [SEDIMENTATION RATE] IN BLOOD: 14 MM/HR — SIGNIFICANT CHANGE UP (ref 0–15)
GLUCOSE SERPL-MCNC: 81 MG/DL — SIGNIFICANT CHANGE UP (ref 70–99)
GLUCOSE SERPL-MCNC: 81 MG/DL — SIGNIFICANT CHANGE UP (ref 70–99)
HCT VFR BLD CALC: 33.5 % — LOW (ref 34.5–45)
HCT VFR BLD CALC: 33.5 % — LOW (ref 34.5–45)
HGB BLD-MCNC: 11.2 G/DL — LOW (ref 11.5–15.5)
HGB BLD-MCNC: 11.2 G/DL — LOW (ref 11.5–15.5)
IMM GRANULOCYTES NFR BLD AUTO: 1.2 % — HIGH (ref 0–0.9)
IMM GRANULOCYTES NFR BLD AUTO: 1.2 % — HIGH (ref 0–0.9)
LKM AB SER-ACNC: <20.1 UNITS — SIGNIFICANT CHANGE UP (ref 0–20)
LKM AB SER-ACNC: <20.1 UNITS — SIGNIFICANT CHANGE UP (ref 0–20)
LYMPHOCYTES # BLD AUTO: 6.18 K/UL — HIGH (ref 1–3.3)
LYMPHOCYTES # BLD AUTO: 6.18 K/UL — HIGH (ref 1–3.3)
LYMPHOCYTES # BLD AUTO: 72.3 % — HIGH (ref 13–44)
LYMPHOCYTES # BLD AUTO: 72.3 % — HIGH (ref 13–44)
MCHC RBC-ENTMCNC: 28.6 PG — SIGNIFICANT CHANGE UP (ref 27–34)
MCHC RBC-ENTMCNC: 28.6 PG — SIGNIFICANT CHANGE UP (ref 27–34)
MCHC RBC-ENTMCNC: 33.4 GM/DL — SIGNIFICANT CHANGE UP (ref 32–36)
MCHC RBC-ENTMCNC: 33.4 GM/DL — SIGNIFICANT CHANGE UP (ref 32–36)
MCV RBC AUTO: 85.5 FL — SIGNIFICANT CHANGE UP (ref 80–100)
MCV RBC AUTO: 85.5 FL — SIGNIFICANT CHANGE UP (ref 80–100)
MITOCHONDRIA AB SER-ACNC: SIGNIFICANT CHANGE UP
MITOCHONDRIA AB SER-ACNC: SIGNIFICANT CHANGE UP
MONOCYTES # BLD AUTO: 0.46 K/UL — SIGNIFICANT CHANGE UP (ref 0–0.9)
MONOCYTES # BLD AUTO: 0.46 K/UL — SIGNIFICANT CHANGE UP (ref 0–0.9)
MONOCYTES NFR BLD AUTO: 5.4 % — SIGNIFICANT CHANGE UP (ref 2–14)
MONOCYTES NFR BLD AUTO: 5.4 % — SIGNIFICANT CHANGE UP (ref 2–14)
NEUTROPHILS # BLD AUTO: 1.67 K/UL — LOW (ref 1.8–7.4)
NEUTROPHILS # BLD AUTO: 1.67 K/UL — LOW (ref 1.8–7.4)
NEUTROPHILS NFR BLD AUTO: 19.5 % — LOW (ref 43–77)
NEUTROPHILS NFR BLD AUTO: 19.5 % — LOW (ref 43–77)
NRBC # BLD: 0 /100 WBCS — SIGNIFICANT CHANGE UP (ref 0–0)
NRBC # BLD: 0 /100 WBCS — SIGNIFICANT CHANGE UP (ref 0–0)
PLATELET # BLD AUTO: 87 K/UL — LOW (ref 150–400)
PLATELET # BLD AUTO: 87 K/UL — LOW (ref 150–400)
POTASSIUM SERPL-MCNC: 3.6 MMOL/L — SIGNIFICANT CHANGE UP (ref 3.5–5.3)
POTASSIUM SERPL-MCNC: 3.6 MMOL/L — SIGNIFICANT CHANGE UP (ref 3.5–5.3)
POTASSIUM SERPL-SCNC: 3.6 MMOL/L — SIGNIFICANT CHANGE UP (ref 3.5–5.3)
POTASSIUM SERPL-SCNC: 3.6 MMOL/L — SIGNIFICANT CHANGE UP (ref 3.5–5.3)
PROT SERPL-MCNC: 6.4 G/DL — SIGNIFICANT CHANGE UP (ref 6–8.3)
PROT SERPL-MCNC: 6.4 G/DL — SIGNIFICANT CHANGE UP (ref 6–8.3)
RBC # BLD: 3.92 M/UL — SIGNIFICANT CHANGE UP (ref 3.8–5.2)
RBC # BLD: 3.92 M/UL — SIGNIFICANT CHANGE UP (ref 3.8–5.2)
RBC # BLD: 4.1 M/UL — SIGNIFICANT CHANGE UP (ref 3.8–5.2)
RBC # BLD: 4.1 M/UL — SIGNIFICANT CHANGE UP (ref 3.8–5.2)
RBC # FLD: 13.2 % — SIGNIFICANT CHANGE UP (ref 10.3–14.5)
RBC # FLD: 13.2 % — SIGNIFICANT CHANGE UP (ref 10.3–14.5)
RETICS #: 66.8 K/UL — SIGNIFICANT CHANGE UP (ref 25–125)
RETICS #: 66.8 K/UL — SIGNIFICANT CHANGE UP (ref 25–125)
RETICS/RBC NFR: 1.6 % — SIGNIFICANT CHANGE UP (ref 0.5–2.5)
RETICS/RBC NFR: 1.6 % — SIGNIFICANT CHANGE UP (ref 0.5–2.5)
SODIUM SERPL-SCNC: 138 MMOL/L — SIGNIFICANT CHANGE UP (ref 135–145)
SODIUM SERPL-SCNC: 138 MMOL/L — SIGNIFICANT CHANGE UP (ref 135–145)
SPECIMEN SOURCE: SIGNIFICANT CHANGE UP
SPECIMEN SOURCE: SIGNIFICANT CHANGE UP
WBC # BLD: 8.55 K/UL — SIGNIFICANT CHANGE UP (ref 3.8–10.5)
WBC # BLD: 8.55 K/UL — SIGNIFICANT CHANGE UP (ref 3.8–10.5)
WBC # FLD AUTO: 8.55 K/UL — SIGNIFICANT CHANGE UP (ref 3.8–10.5)
WBC # FLD AUTO: 8.55 K/UL — SIGNIFICANT CHANGE UP (ref 3.8–10.5)

## 2023-12-06 PROCEDURE — 99232 SBSQ HOSP IP/OBS MODERATE 35: CPT

## 2023-12-06 RX ORDER — POLYETHYLENE GLYCOL 3350 17 G/17G
17 POWDER, FOR SOLUTION ORAL ONCE
Refills: 0 | Status: COMPLETED | OUTPATIENT
Start: 2023-12-06 | End: 2023-12-06

## 2023-12-06 RX ORDER — PREGABALIN 225 MG/1
1000 CAPSULE ORAL DAILY
Refills: 0 | Status: DISCONTINUED | OUTPATIENT
Start: 2023-12-06 | End: 2023-12-08

## 2023-12-06 RX ADMIN — URSODIOL 300 MILLIGRAM(S): 250 TABLET, FILM COATED ORAL at 05:17

## 2023-12-06 RX ADMIN — POLYETHYLENE GLYCOL 3350 17 GRAM(S): 17 POWDER, FOR SOLUTION ORAL at 16:49

## 2023-12-06 RX ADMIN — URSODIOL 300 MILLIGRAM(S): 250 TABLET, FILM COATED ORAL at 16:49

## 2023-12-06 NOTE — CARE COORDINATION ASSESSMENT. - NSCAREPROVIDERS_GEN_ALL_CORE_FT
CARE PROVIDERS:  Accepting Physician: Radha Ortiz  Administration: Jcarlos Hairston  Admitting: Radha Ortiz  Attending: Dominic Stone  Case Management: Dean Bruner  Consultant: Damion Venegas  Consultant: Shan Finley  Consultant: Shantell Vargas  Consultant: Radha Stone  Covering Team: Christine Parra  Covering Team: Eduardo Arteaga  ED Attending: Christine Newell  ED Nurse: Altaf Johns  ED Nurse 2: Kristi Smith  Infection Control: Deb Franco  Nurse: Akil Barnett  Nurse: Lo Torres  Nurse: Rossy Reynoso  Ordered: Doctor, Unknown  Ordered: ADM, User  Override: Denis Velazquez  Override: Shalom Polanco  PCA/Nursing Assistant: Sanjana Barrera  Primary Team: Radha Ortiz  Primary Team: Dominic Stone  Primary Team: Rachael Turpin  Primary Team: Denis Guzman  Registered Dietitian: Ramonita Alcocer  Respiratory Therapy: Bibi Castillo  : Jessi Arroyo  Team: [facility], Team

## 2023-12-06 NOTE — PROGRESS NOTE ADULT - ASSESSMENT
22 yo female with no PMHx presents to the ED for jaundice and epigastric fullness x1 day. Admitted for elevated transaminases with associated jaundice.

## 2023-12-06 NOTE — PROGRESS NOTE ADULT - SUBJECTIVE AND OBJECTIVE BOX
Patient is a 23y old  Female who presents with a chief complaint of Jaundice/Elevated LFTs (05 Dec 2023 15:25)       INTERVAL HPI/OVERNIGHT EVENTS: Patient seen and examined at bedside. Mother also present. Denies any complaints. Wants to eat, appetite is really good. Denies chest pain, abdominal pain, N/V/D, SOB, palpitations     MEDICATIONS  (STANDING):  sodium chloride 0.9%. 1000 milliLiter(s) (80 mL/Hr) IV Continuous <Continuous>  ursodiol Capsule 300 milliGRAM(s) Oral every 12 hours    MEDICATIONS  (PRN):  acetaminophen     Tablet .. 650 milliGRAM(s) Oral every 6 hours PRN Temp greater or equal to 38C (100.4F), Mild Pain (1 - 3)  aluminum hydroxide/magnesium hydroxide/simethicone Suspension 30 milliLiter(s) Oral every 4 hours PRN Dyspepsia  melatonin 3 milliGRAM(s) Oral at bedtime PRN Insomnia  ondansetron Injectable 4 milliGRAM(s) IV Push every 8 hours PRN Nausea and/or Vomiting      Allergies    sulfa drugs (Rash)  penicillin (Hives)    Intolerances        REVIEW OF SYSTEMS:  Per HPI, all other ROS noted Negative   Vital Signs Last 24 Hrs  T(C): 36.7 (06 Dec 2023 05:15), Max: 37.4 (05 Dec 2023 12:56)  T(F): 98.1 (06 Dec 2023 05:15), Max: 99.4 (05 Dec 2023 12:56)  HR: 80 (06 Dec 2023 05:15) (80 - 92)  BP: 110/69 (06 Dec 2023 05:15) (110/69 - 118/68)  BP(mean): --  RR: 18 (06 Dec 2023 05:15) (18 - 19)  SpO2: 93% (06 Dec 2023 05:15) (93% - 97%)    Parameters below as of 06 Dec 2023 05:15  Patient On (Oxygen Delivery Method): room air        PHYSICAL EXAM:  GENERAL: NAD, well-groomed, well-developed  HEAD:  Atraumatic, Normocephalic  EYES: EOMI, PERRLA  ENMT: No tonsillar erythema, exudates, or enlargement; Moist mucous membranes  NECK: Supple, No JVD, Normal thyroid  NERVOUS SYSTEM:  Alert & Oriented X3, Good concentration; Motor Strength 5/5 B/L upper and lower extremities  CHEST/LUNG: Clear to auscultation bilaterally; No rales, rhonchi, wheezing, or rubs  HEART: Regular rate and rhythm; No murmurs  ABDOMEN: Soft, Nontender, Nondistended; Bowel sounds present  EXTREMITIES:  2+ Peripheral Pulses, No clubbing, cyanosis, or edema  LYMPH: No lymphadenopathy noted  SKIN: No rashes or lesions    LABS:                        11.2   8.55  )-----------( 87       ( 06 Dec 2023 08:45 )             33.5     06 Dec 2023 08:45    138    |  105    |  6      ----------------------------<  81     3.6     |  26     |  0.57     Ca    7.8        06 Dec 2023 08:45    TPro  6.4    /  Alb  2.5    /  TBili  6.9    /  DBili  x      /  AST  528    /  ALT  681    /  AlkPhos  583    06 Dec 2023 08:45      CAPILLARY BLOOD GLUCOSE        BLOOD CULTURE  12-05 @ 01:00   Culture in progress  --  --    RADIOLOGY & ADDITIONAL TESTS:    Imaging Personally Reviewed:  [ ] YES     Consultant(s) Notes Reviewed:      Care Discussed with Consultants/Other Providers:

## 2023-12-06 NOTE — CONSULT NOTE ADULT - ASSESSMENT
[ASSESSMENT and  PLAN]  D82.3    EBV infection  D69.6    Thrombocytopenia  J12.12   Parainfluenza  R17       Jaundice  D63.8    Anemia chronic disease /acute inflammation  R16.1    Splenomegaly due to EBV  R59. 9   Enlarged lymph nodes, unspecified.    24yo F with now major PMH, admitted with acute hepatitis jaundice  ·	,   +Parainflunza infection on RVP  EBV. Found also with EBV infection with serologies reflecting recent infection,   --EBV Capsid IgG NEG  +EBV Nuclear Ag POS  +EBV IgM POS  +EBV Early Ag POS  +EBV VCA IgG POS  --EBV NA IgG NEG  +EBV VAC IgM POS  +EBV EA Ab POS    +Sick contacts  Mother with COVID 1-2mo ago  Younger sister with non-specific viral illness recently.   2-3+wk of mild fatigue  1wk hx of recent URI sx starting last week Wed, initially with feverl.     Thrombocytopenia likely due to hepatitis due to EBV and parainfluenza infection.   Mild splenomegaly due to EBV.   ·	Doubt due to parainfluenza  ·	No clear family hx of Thalassemia  ·	No other sx for LPD  ·	Exam without any DOUGLAS, palpable. except Mild mobile soft ~ 1cm R subemntal at level IIA    Mild anemia due to "chronic disease" / acute illness /inflammation    Family hx of [place] Disease, [Father]  No personal hx of [place] Dz      RECOMMENDATIONS  Transfuse PRBC as clinically indicated.   Transfuse PRBC if Hgb <7.0 or if symptomatic.   Follow CBC    Check Anemia studies.      Ferritin, Iron studies     B12, Folate     ESR, CRP  Start MV tabs and B12 PO empirically post labs.     Supportive measures for viral infection    DVT Prophylaxis  SQ Lovenox or SQ heparin  OOB as sha     Discussed plan of care with patient and or family [Father] in detail.   Pt/Family expressed understanding of the treatment plan.   Risks, benefits and alternatives discussed in detail.   Opportunity given for questions and discussion.   Questions or concerns all addressed and answered to their satisfaction, and in lay terms.     Discussed with Dr PANTERA Vargas [ID], Dr KJ Parra [Medicine]    Thank you for consulting us.     Follow in office in 6wks to re-eval, for resolution of cytopenia, mild splenomegaly and R cervical IIA LN.      > 57 minutes spent in direct patient care, examining and counseling patient,  reviewing  the notes, lab data/ imaging , discussion with multidisciplinary team.      [ASSESSMENT and  PLAN]  D82.3    EBV infection  D69.6    Thrombocytopenia  J12.12   Parainfluenza  R17       Jaundice  D63.8    Anemia chronic disease /acute inflammation  R16.1    Splenomegaly due to EBV  R59. 9   Enlarged lymph nodes, unspecified.    24yo F with now major PMH, admitted with acute hepatitis jaundice  ·	,   +Parainflunza infection on RVP  EBV. Found also with EBV infection with serologies reflecting recent infection,   --EBV Capsid IgG NEG  +EBV Nuclear Ag POS  +EBV IgM POS  +EBV Early Ag POS  +EBV VCA IgG POS  --EBV NA IgG NEG  +EBV VAC IgM POS  +EBV EA Ab POS    +Sick contacts  Mother with COVID 1-2mo ago  Younger sister with non-specific viral illness recently.   2-3+wk of mild fatigue  1wk hx of recent URI sx starting last week Wed, initially with feverl.     Thrombocytopenia likely due to hepatitis due to EBV and parainfluenza infection.   Mild splenomegaly due to EBV.   ·	Doubt due to parainfluenza  ·	No clear family hx of Thalassemia  ·	No other sx for LPD  ·	Exam without any DOUGLAS, palpable. except Mild mobile soft ~ 1cm R subemntal at level IIA    Mild anemia due to "chronic disease" / acute illness /inflammation    Family hx of [place] Disease, [Father]  No personal hx of [place] Dz      RECOMMENDATIONS  Transfuse PRBC as clinically indicated.   Transfuse PRBC if Hgb <7.0 or if symptomatic.   Follow CBC    Check Anemia studies.      Ferritin, Iron studies     B12, Folate     ESR, CRP  Start MV tabs and B12 PO empirically post labs.     Supportive measures for viral infection    DVT Prophylaxis  SQ Lovenox or SQ heparin  OOB as sha     Discussed plan of care with patient and or family [Father] in detail.   Pt/Family expressed understanding of the treatment plan.   Risks, benefits and alternatives discussed in detail.   Opportunity given for questions and discussion.   Questions or concerns all addressed and answered to their satisfaction, and in lay terms.     Discussed with Dr PANTERA Vargas [ID], Dr KJ Parra [Medicine]    Thank you for consulting us.     Follow in office in 6wks to re-eval, for resolution of cytopenia, mild splenomegaly and R cervical IIA LN.      > 57 minutes spent in direct patient care, examining and counseling patient,  reviewing  the notes, lab data/ imaging , discussion with multidisciplinary team.

## 2023-12-06 NOTE — PROGRESS NOTE ADULT - SUBJECTIVE AND OBJECTIVE BOX
[facility]  Shan Stone PA-C  [address] [place] [address]  [phone]      INTERVAL HPI/OVERNIGHT EVENTS:  Pt s/e with mother at bedside  Pt c/o some epigastric discomfort and constipation  Otherwise tolerating diet and has no further GI complaints    MEDICATIONS  (STANDING):  polyethylene glycol 3350 17 Gram(s) Oral once  sodium chloride 0.9%. 1000 milliLiter(s) (80 mL/Hr) IV Continuous <Continuous>  ursodiol Capsule 300 milliGRAM(s) Oral every 12 hours    MEDICATIONS  (PRN):  acetaminophen     Tablet .. 650 milliGRAM(s) Oral every 6 hours PRN Temp greater or equal to 38C (100.4F), Mild Pain (1 - 3)  aluminum hydroxide/magnesium hydroxide/simethicone Suspension 30 milliLiter(s) Oral every 4 hours PRN Dyspepsia  melatonin 3 milliGRAM(s) Oral at bedtime PRN Insomnia  ondansetron Injectable 4 milliGRAM(s) IV Push every 8 hours PRN Nausea and/or Vomiting      Allergies    sulfa drugs (Rash)  penicillin (Hives)        PHYSICAL EXAM:   Vital Signs:  Vital Signs Last 24 Hrs  T(C): 36.7 (06 Dec 2023 05:15), Max: 36.7 (05 Dec 2023 18:34)  T(F): 98.1 (06 Dec 2023 05:15), Max: 98.1 (05 Dec 2023 18:34)  HR: 80 (06 Dec 2023 05:15) (80 - 90)  BP: 110/69 (06 Dec 2023 05:15) (110/69 - 118/68)  BP(mean): --  RR: 18 (06 Dec 2023 05:15) (18 - 18)  SpO2: 93% (06 Dec 2023 05:15) (93% - 96%)    Parameters below as of 06 Dec 2023 05:15  Patient On (Oxygen Delivery Method): room air      Daily     Daily Weight in k.4 (06 Dec 2023 05:19)    GENERAL:  Appears stated age  HEENT:  NC/AT  CHEST:  Full & symmetric excursion  HEART:  Regular rhythm  ABDOMEN:  Soft, non-tender, non-distended  EXTEREMITIES:  no cyanosis  SKIN:  No rash  NEURO:  Alert      LABS:                        11.2   8.55  )-----------( 87       ( 06 Dec 2023 08:45 )             33.5     12    138  |  105  |  6<L>  ----------------------------<  81  3.6   |  26  |  0.57    Ca    7.8<L>      06 Dec 2023 08:45    TPro  6.4  /  Alb  2.5<L>  /  TBili  6.9<H>  /  DBili  x   /  AST  528<H>  /  ALT  681<H>  /  AlkPhos  583<H>        Urinalysis Basic - ( 06 Dec 2023 08:45 )    Color: x / Appearance: x / SG: x / pH: x  Gluc: 81 mg/dL / Ketone: x  / Bili: x / Urobili: x   Blood: x / Protein: x / Nitrite: x   Leuk Esterase: x / RBC: x / WBC x   Sq Epi: x / Non Sq Epi: x / Bacteria: x   [facility]  Shan Stone PA-C  [address] [place] [address]  [phone]      INTERVAL HPI/OVERNIGHT EVENTS:  Pt s/e with mother at bedside  Pt c/o some epigastric discomfort and constipation  Otherwise tolerating diet and has no further GI complaints    MEDICATIONS  (STANDING):  polyethylene glycol 3350 17 Gram(s) Oral once  sodium chloride 0.9%. 1000 milliLiter(s) (80 mL/Hr) IV Continuous <Continuous>  ursodiol Capsule 300 milliGRAM(s) Oral every 12 hours    MEDICATIONS  (PRN):  acetaminophen     Tablet .. 650 milliGRAM(s) Oral every 6 hours PRN Temp greater or equal to 38C (100.4F), Mild Pain (1 - 3)  aluminum hydroxide/magnesium hydroxide/simethicone Suspension 30 milliLiter(s) Oral every 4 hours PRN Dyspepsia  melatonin 3 milliGRAM(s) Oral at bedtime PRN Insomnia  ondansetron Injectable 4 milliGRAM(s) IV Push every 8 hours PRN Nausea and/or Vomiting      Allergies    sulfa drugs (Rash)  penicillin (Hives)        PHYSICAL EXAM:   Vital Signs:  Vital Signs Last 24 Hrs  T(C): 36.7 (06 Dec 2023 05:15), Max: 36.7 (05 Dec 2023 18:34)  T(F): 98.1 (06 Dec 2023 05:15), Max: 98.1 (05 Dec 2023 18:34)  HR: 80 (06 Dec 2023 05:15) (80 - 90)  BP: 110/69 (06 Dec 2023 05:15) (110/69 - 118/68)  BP(mean): --  RR: 18 (06 Dec 2023 05:15) (18 - 18)  SpO2: 93% (06 Dec 2023 05:15) (93% - 96%)    Parameters below as of 06 Dec 2023 05:15  Patient On (Oxygen Delivery Method): room air      Daily     Daily Weight in k.4 (06 Dec 2023 05:19)    GENERAL:  Appears stated age  HEENT:  NC/AT  CHEST:  Full & symmetric excursion  HEART:  Regular rhythm  ABDOMEN:  Soft, non-tender, non-distended  EXTEREMITIES:  no cyanosis  SKIN:  No rash  NEURO:  Alert      LABS:                        11.2   8.55  )-----------( 87       ( 06 Dec 2023 08:45 )             33.5     12    138  |  105  |  6<L>  ----------------------------<  81  3.6   |  26  |  0.57    Ca    7.8<L>      06 Dec 2023 08:45    TPro  6.4  /  Alb  2.5<L>  /  TBili  6.9<H>  /  DBili  x   /  AST  528<H>  /  ALT  681<H>  /  AlkPhos  583<H>        Urinalysis Basic - ( 06 Dec 2023 08:45 )    Color: x / Appearance: x / SG: x / pH: x  Gluc: 81 mg/dL / Ketone: x  / Bili: x / Urobili: x   Blood: x / Protein: x / Nitrite: x   Leuk Esterase: x / RBC: x / WBC x   Sq Epi: x / Non Sq Epi: x / Bacteria: x

## 2023-12-06 NOTE — CARE COORDINATION ASSESSMENT. - OTHER PERTINENT DISCHARGE PLANNING INFORMATION:
Spoke with pt over phone (pt's mother in room) due to isolation.  Explained role of CM, verbalized understanding. Pt was made aware a CM will remain available through hospitalization.  Contact information given in discharge/ transitions resource folder.

## 2023-12-06 NOTE — PROGRESS NOTE ADULT - ASSESSMENT
a/p inc lfts  +parainfluenza  ? mononucleosis    EBV noted, ?possibly elevated LFT 2/2 mono  ID following  add ursodiol 300bid  supportive care   inc po intake  gerd precautions  d/w patient and family

## 2023-12-06 NOTE — CONSULT NOTE ADULT - SUBJECTIVE AND OBJECTIVE BOX
Patient is a 23y old  Female who presents with a chief complaint of Jaundice/Elevated LFTs (06 Dec 2023 15:27)    HPI:  22 yo female with no PMHx presents to the ED for jaundice and epigastric fullness x1 day. Pt states that she has been experiencing URI symptoms for the last week. Elicits fever, headache, mild dizziness, cough, congestion, nausea, decreased PO intake.Tmax 102. Pt has been afebrile for 48 hours Pt states that she had one episode of diarrhea and vomiting yesterday (unknown if bilious; however, mother elicits no hematochezia). This AM pt went to urgent care. Sent to the hospital for further evaluation. Pt does not elicit any prior episode. Of note, father with hx of [place] disease.   ED course  Vitals: T 97.9, HR 96, /80, RR 18, SpO2 98% on RA  Significant labs: WBC 12.35, K 3.3, Tbili 6.9, Alk phos 459, ,    Imaging:   CT abdomen and pelvis showed No acute CT findings in the abdomen or pelvis. Mild splenomegaly.  RUQ US showed No cholelithiasis or sonographic evidence of acute cholecystitis. Question mild periportal hyperechogenicity, which is nonspecific. Recommend clinical correlation and correlation with LFTs.  In ED patient given: 1L NS bolus x2, pepcid 20mg x1, morphine 2mg x1, Zofran 4mg x1, KCl 40meq x1    (05 Dec 2023 00:39)      PAST MEDICAL & SURGICAL HISTORY:  No pertinent past medical history  No significant past surgical history       HEALTH ISSUES - PROBLEM Dx:  Elevated transaminase level  Electrolyte abnormality  Need for prophylactic measure  Parainfluenza infection  Thrombocytopenia    Other specified abnormal findings of blood chemistry [R79.89]  No pertinent past medical history [440248550]  No significant past surgical history [612815764]      FAMILY HISTORY:  FH: Gilbert's disease (Father)  FH: liver cancer (Grandparent)  FH: colon cancer (Grandparent)      [SOCIAL HISTORY: ]     smoking:  non-smoker     EtOH:  no EtOH     illicit drugs:  none      occupation:  Teacher     marital status:  single, no children     Other:       [ALLERGIES/INTOLERANCES:]  Allergies     sulfa drugs (Rash)     penicillin (Hives)  Intolerances      [MEDICATIONS]  MEDICATIONS  (STANDING):  polyethylene glycol 3350 17 Gram(s) Oral once  sodium chloride 0.9%. 1000 milliLiter(s) (80 mL/Hr) IV Continuous <Continuous>  ursodiol Capsule 300 milliGRAM(s) Oral every 12 hours      MEDICATIONS  (PRN):  acetaminophen     Tablet .. 650 milliGRAM(s) Oral every 6 hours PRN Temp greater or equal to 38C (100.4F), Mild Pain (1 - 3)  aluminum hydroxide/magnesium hydroxide/simethicone Suspension 30 milliLiter(s) Oral every 4 hours PRN Dyspepsia  melatonin 3 milliGRAM(s) Oral at bedtime PRN Insomnia  ondansetron Injectable 4 milliGRAM(s) IV Push every 8 hours PRN Nausea and/or Vomiting      [REVIEW OF SYSTEMS: ]  CONSTITUTIONAL: normal, no fever, no shakes, no chills   EYES: No eye pain, no visual disturbances, no discharge  ENMT:  no discharge  NECK: No pain, no stiffness  BREASTS: No pain, no masses, no nipple discharge  RESPIRATORY: No cough, no wheezing, no chills, no hemoptysis; No shortness of breath  CARDIOVASCULAR: No chest pain, no palpitations, no dizziness, no leg swelling  GASTROINTESTINAL: No abdominal, no epigastric pain. No nausea, no vomiting, no hematemesis; No diarrhea , no constipation. No melena, no hematochezia.  GENITOURINARY: No dysuria, no frequency, no hematuria, no incontinence  NEUROLOGICAL: No headaches, no memory loss, no loss of strength, no numbness, no tremors  SKIN: No itching, no burning, no rashes, no lesions   LYMPH NODES: No enlarged glands  ENDOCRINE: No heat or cold intolerance; No hair loss  MUSCULOSKELETAL: No joint pain or swelling; No muscle, no back, no extremity pain  PSYCHIATRIC: No depression, no anxiety, no mood swings, no difficulty sleeping  HEME/LYMPH: No easy bruising, no bleeding gums      [VITALS SIGNS 24hrs]  Vital Signs Last 24 Hrs  T(C): 36.7 (06 Dec 2023 05:15), Max: 36.7 (05 Dec 2023 18:34)  T(F): 98.1 (06 Dec 2023 05:15), Max: 98.1 (05 Dec 2023 18:34)  HR: 80 (06 Dec 2023 05:15) (80 - 90)  BP: 110/69 (06 Dec 2023 05:15) (110/69 - 118/68)  BP(mean): --  RR: 18 (06 Dec 2023 05:15) (18 - 18)  SpO2: 93% (06 Dec 2023 05:15) (93% - 96%)    Parameters below as of 06 Dec 2023 05:15  Patient On (Oxygen Delivery Method): room air    Daily     Daily Weight in k.4 (06 Dec 2023 05:19)    I&O's Summary  05 Dec 2023 07:01  -  06 Dec 2023 07:00  --------------------------------------------------------  IN: 240 mL / OUT: 0 mL / NET: 240 mL    [PHYSICAL EXAM]  GEN: +jaundice  HEENT: normocephalic and atraumatic. EOMI. PERRL.    NECK: Supple.  No lymphadenopathy   LUNGS: Clear to auscultation.  HEART: S1S2 Regular rate and rhythm, no MRG  ABDOMEN: Soft, nontender, and nondistended.  Positive bowel sounds.  Spleen not palapble  : No CVA tenderness  EXTREMITIES: Without edema.  NEUROLOGIC: grossly intact.  PSYCHIATRIC: Appropriate affect .  SKIN: No rash     [LABS: ]                        11.2   8.55  )-----------( 87       ( 06 Dec 2023 08:45 )             33.5     CBC Full  -  ( 06 Dec 2023 08:45 )  WBC Count : 8.55 K/uL  RBC Count : 3.92 M/uL  Hemoglobin : 11.2 g/dL  Hematocrit : 33.5 %  Platelet Count - Automated : 87 K/uL  Mean Cell Volume : 85.5 fl  Mean Cell Hemoglobin : 28.6 pg  Mean Cell Hemoglobin Concentration : 33.4 gm/dL  Auto Neutrophil # : 1.67 K/uL  Auto Lymphocyte # : 6.18 K/uL  Auto Monocyte # : 0.46 K/uL  Auto Eosinophil # : 0.06 K/uL  Auto Basophil # : 0.08 K/uL  Auto Neutrophil % : 19.5 %  Auto Lymphocyte % : 72.3 %  Auto Monocyte % : 5.4 %  Auto Eosinophil % : 0.7 %  Auto Basophil % : 0.9 %      138  |  105  |  6<L>  ----------------------------<  81  3.6   |  26  |  0.57  Ca    7.8<L>      06 Dec 2023 08:45  TPro  6.4  /  Alb  2.5<L>  /  TBili  6.9<H>  /  DBili  x   /  AST  528<H>  /  ALT  681<H>  /  AlkPhos  583<H>      LIVER FUNCTIONS - ( 06 Dec 2023 08:45 )  Alb: 2.5 g/dL / Pro: 6.4 g/dL / ALK PHOS: 583 U/L / ALT: 681 U/L / AST: 528 U/L / GGT: x           Urinalysis Basic - ( 06 Dec 2023 08:45 )  Color: x / Appearance: x / SG: x / pH: x  Gluc: 81 mg/dL / Ketone: x  / Bili: x / Urobili: x   Blood: x / Protein: x / Nitrite: x   Leuk Esterase: x / RBC: x / WBC x   Sq Epi: x / Non Sq Epi: x / Bacteria: x    CBC TREND (5 Days)  WBC Count: 8.55 K/uL ( @ 08:45)  WBC Count: 11.18 K/uL ( @ 04:31)  WBC Count: 12.35 K/uL ( @ 22:15)  Hemoglobin: 11.2 g/dL ( @ 08:45)  Hemoglobin: 10.7 g/dL ( 04:31)  Hemoglobin: 12.3 g/dL ( 22:15)  Hematocrit: 33.5 % ( 08:45)  Hematocrit: 31.7 % ( 04:31)  Hematocrit: 36.2 % ( @ 22:15)    Platelet Count - Automated: 87 K/uL ( @ 08:45)  Platelet Count - Automated: 90 K/uL ( @ 04:31)  Platelet Count - Automated: 77 K/uL ( @ 22:15)      [MICROBIOLOGY /  VIROLOGY:]  SARS-CoV-2: NotDetec (05 Dec 2023 01:14)    Babesia microti PCR, Bld (collected 05 Dec 2023 16:01)    Culture - Urine (collected 05 Dec 2023 01:00)  Source: Clean Catch Clean Catch (Midstream)  Final Report (06 Dec 2023 14:29):    10,000 - 49,000 CFU/mL Corynebacterium coyleae "Susceptibilities not    performed"    Lani-Barr Virus Serologic Test (23 @ 16:01)   Method: Liaison Chemiluminescent Immunoassay     Lani-Barr Virus Capsid Antigen IgG: Negative: Lani-Barr Virus VCA IgG Antibody   Lani-Barr Nuclear Antigen: Negative: Lani-Barr Virus NA IgG Antibody   Lani Barr Virus IgM Antibody: Positive: Lani-Barr Virus VCA IgM Antibody   Lani-Barr Virus Early Antigen: Positive: Lani-Barr Virus EA IgG Antibody   EBV Interpretation: See Note: Interpretation of EBV-Specific Antibody Results   EBV VCA IgG EIA: 17.3 U/mL  EBNA IgG EIA: <3.0 U/mL  EBV VCA IgM EIA: 55.9 U/mL  EBV EA Ab EIA: 27.6 U/mL        [PATHOLOGY]       [RADIOLOGY & ADDITIONAL STUDIES:]   CT Abdomen and Pelvis w/ IV Cont:   ACC: [id number] EXAM:  CT ABDOMEN AND PELVIS IC   ORDERED BY:  GUDELIA MARISCAL   PROCEDURE DATE:  2023    INTERPRETATION:  CLINICAL INFORMATION: 23 years old. Female. epigastric pain/jaundice.  COMPARISON: None.  CONTRAST/COMPLICATIONS:  ·	IV Contrast: Omnipaque 350  90 cc administered  10 cc discarded.  ·	Oral Contrast: NONE  ·	Complications: None reported at time of study completion  PROCEDURE:  ·	CT of the Abdomen and Pelvis was performed.  ·	Sagittal and coronal reformats were performed.  FINDINGS:  LOWER CHEST: Within normal limits.  LIVER: Within normal limits.  BILE DUCTS: Normal caliber.  GALLBLADDER: Within normal limits.  SPLEEN: Mild splenomegaly.  PANCREAS: Within normal limits.  ADRENALS: Within normal limits.  KIDNEYS/URETERS: Within normal limits.  BLADDER: Within normal limits.  REPRODUCTIVE ORGANS: Uterus and adnexa are grossly unremarkable.  BOWEL: No bowel obstruction. Appendix is unremarkable.  PERITONEUM: No ascites.  VESSELS: Normal caliber abdominal aorta.  RETROPERITONEUM/LYMPH NODES: No lymphadenopathy.  ABDOMINAL WALL: Within normal limits.  BONES: Within normal limits. Thoracolumbar levoscoliosis.    IMPRESSION:  No acute CT findings in the abdomen or pelvis. Mild splenomegaly.  --- End of Report ---  JOSH PRADHAN MD; Attending Radiologist  This document has been electronically signed. Dec  4 2023 11:04PM (23 @ 22:35)   Patient is a 23y old  Female who presents with a chief complaint of Jaundice/Elevated LFTs (06 Dec 2023 15:27)    HPI:  22 yo female with no PMHx presents to the ED for jaundice and epigastric fullness x1 day. Pt states that she has been experiencing URI symptoms for the last week. Elicits fever, headache, mild dizziness, cough, congestion, nausea, decreased PO intake.Tmax 102. Pt has been afebrile for 48 hours Pt states that she had one episode of diarrhea and vomiting yesterday (unknown if bilious; however, mother elicits no hematochezia). This AM pt went to urgent care. Sent to the hospital for further evaluation. Pt does not elicit any prior episode. Of note, father with hx of [place] disease.   ED course  Vitals: T 97.9, HR 96, /80, RR 18, SpO2 98% on RA  Significant labs: WBC 12.35, K 3.3, Tbili 6.9, Alk phos 459, ,    Imaging:   CT abdomen and pelvis showed No acute CT findings in the abdomen or pelvis. Mild splenomegaly.  RUQ US showed No cholelithiasis or sonographic evidence of acute cholecystitis. Question mild periportal hyperechogenicity, which is nonspecific. Recommend clinical correlation and correlation with LFTs.  In ED patient given: 1L NS bolus x2, pepcid 20mg x1, morphine 2mg x1, Zofran 4mg x1, KCl 40meq x1    (05 Dec 2023 00:39)      PAST MEDICAL & SURGICAL HISTORY:  No pertinent past medical history  No significant past surgical history       HEALTH ISSUES - PROBLEM Dx:  Elevated transaminase level  Electrolyte abnormality  Need for prophylactic measure  Parainfluenza infection  Thrombocytopenia    Other specified abnormal findings of blood chemistry [R79.89]  No pertinent past medical history [[id number]]  No significant past surgical history [[id number]]      FAMILY HISTORY:  FH: Gilbert's disease (Father)  FH: liver cancer (Grandparent)  FH: colon cancer (Grandparent)      [SOCIAL HISTORY: ]     smoking:  non-smoker     EtOH:  no EtOH     illicit drugs:  none      occupation:  Teacher     marital status:  single, no children     Other:       [ALLERGIES/INTOLERANCES:]  Allergies     sulfa drugs (Rash)     penicillin (Hives)  Intolerances      [MEDICATIONS]  MEDICATIONS  (STANDING):  polyethylene glycol 3350 17 Gram(s) Oral once  sodium chloride 0.9%. 1000 milliLiter(s) (80 mL/Hr) IV Continuous <Continuous>  ursodiol Capsule 300 milliGRAM(s) Oral every 12 hours      MEDICATIONS  (PRN):  acetaminophen     Tablet .. 650 milliGRAM(s) Oral every 6 hours PRN Temp greater or equal to 38C (100.4F), Mild Pain (1 - 3)  aluminum hydroxide/magnesium hydroxide/simethicone Suspension 30 milliLiter(s) Oral every 4 hours PRN Dyspepsia  melatonin 3 milliGRAM(s) Oral at bedtime PRN Insomnia  ondansetron Injectable 4 milliGRAM(s) IV Push every 8 hours PRN Nausea and/or Vomiting      [REVIEW OF SYSTEMS: ]  CONSTITUTIONAL: normal, no fever, no shakes, no chills   EYES: No eye pain, no visual disturbances, no discharge  ENMT:  no discharge  NECK: No pain, no stiffness  BREASTS: No pain, no masses, no nipple discharge  RESPIRATORY: No cough, no wheezing, no chills, no hemoptysis; No shortness of breath  CARDIOVASCULAR: No chest pain, no palpitations, no dizziness, no leg swelling  GASTROINTESTINAL: No abdominal, no epigastric pain. No nausea, no vomiting, no hematemesis; No diarrhea , no constipation. No melena, no hematochezia.  GENITOURINARY: No dysuria, no frequency, no hematuria, no incontinence  NEUROLOGICAL: No headaches, no memory loss, no loss of strength, no numbness, no tremors  SKIN: No itching, no burning, no rashes, no lesions   LYMPH NODES: No enlarged glands  ENDOCRINE: No heat or cold intolerance; No hair loss  MUSCULOSKELETAL: No joint pain or swelling; No muscle, no back, no extremity pain  PSYCHIATRIC: No depression, no anxiety, no mood swings, no difficulty sleeping  HEME/LYMPH: No easy bruising, no bleeding gums      [VITALS SIGNS 24hrs]  Vital Signs Last 24 Hrs  T(C): 36.7 (06 Dec 2023 05:15), Max: 36.7 (05 Dec 2023 18:34)  T(F): 98.1 (06 Dec 2023 05:15), Max: 98.1 (05 Dec 2023 18:34)  HR: 80 (06 Dec 2023 05:15) (80 - 90)  BP: 110/69 (06 Dec 2023 05:15) (110/69 - 118/68)  BP(mean): --  RR: 18 (06 Dec 2023 05:15) (18 - 18)  SpO2: 93% (06 Dec 2023 05:15) (93% - 96%)    Parameters below as of 06 Dec 2023 05:15  Patient On (Oxygen Delivery Method): room air    Daily     Daily Weight in k.4 (06 Dec 2023 05:19)    I&O's Summary  05 Dec 2023 07:01  -  06 Dec 2023 07:00  --------------------------------------------------------  IN: 240 mL / OUT: 0 mL / NET: 240 mL    [PHYSICAL EXAM]  GEN: +jaundice  HEENT: normocephalic and atraumatic. EOMI. PERRL.    NECK: Supple.  No lymphadenopathy   LUNGS: Clear to auscultation.  HEART: S1S2 Regular rate and rhythm, no MRG  ABDOMEN: Soft, nontender, and nondistended.  Positive bowel sounds.  Spleen not palapble  : No CVA tenderness  EXTREMITIES: Without edema.  NEUROLOGIC: grossly intact.  PSYCHIATRIC: Appropriate affect .  SKIN: No rash     [LABS: ]                        11.2   8.55  )-----------( 87       ( 06 Dec 2023 08:45 )             33.5     CBC Full  -  ( 06 Dec 2023 08:45 )  WBC Count : 8.55 K/uL  RBC Count : 3.92 M/uL  Hemoglobin : 11.2 g/dL  Hematocrit : 33.5 %  Platelet Count - Automated : 87 K/uL  Mean Cell Volume : 85.5 fl  Mean Cell Hemoglobin : 28.6 pg  Mean Cell Hemoglobin Concentration : 33.4 gm/dL  Auto Neutrophil # : 1.67 K/uL  Auto Lymphocyte # : 6.18 K/uL  Auto Monocyte # : 0.46 K/uL  Auto Eosinophil # : 0.06 K/uL  Auto Basophil # : 0.08 K/uL  Auto Neutrophil % : 19.5 %  Auto Lymphocyte % : 72.3 %  Auto Monocyte % : 5.4 %  Auto Eosinophil % : 0.7 %  Auto Basophil % : 0.9 %      138  |  105  |  6<L>  ----------------------------<  81  3.6   |  26  |  0.57  Ca    7.8<L>      06 Dec 2023 08:45  TPro  6.4  /  Alb  2.5<L>  /  TBili  6.9<H>  /  DBili  x   /  AST  528<H>  /  ALT  681<H>  /  AlkPhos  583<H>      LIVER FUNCTIONS - ( 06 Dec 2023 08:45 )  Alb: 2.5 g/dL / Pro: 6.4 g/dL / ALK PHOS: 583 U/L / ALT: 681 U/L / AST: 528 U/L / GGT: x           Urinalysis Basic - ( 06 Dec 2023 08:45 )  Color: x / Appearance: x / SG: x / pH: x  Gluc: 81 mg/dL / Ketone: x  / Bili: x / Urobili: x   Blood: x / Protein: x / Nitrite: x   Leuk Esterase: x / RBC: x / WBC x   Sq Epi: x / Non Sq Epi: x / Bacteria: x    CBC TREND (5 Days)  WBC Count: 8.55 K/uL ( @ 08:45)  WBC Count: 11.18 K/uL ( @ 04:31)  WBC Count: 12.35 K/uL ( @ 22:15)  Hemoglobin: 11.2 g/dL ( @ 08:45)  Hemoglobin: 10.7 g/dL ( 04:31)  Hemoglobin: 12.3 g/dL ( 22:15)  Hematocrit: 33.5 % ( 08:45)  Hematocrit: 31.7 % ( 04:31)  Hematocrit: 36.2 % ( @ 22:15)    Platelet Count - Automated: 87 K/uL ( @ 08:45)  Platelet Count - Automated: 90 K/uL ( @ 04:31)  Platelet Count - Automated: 77 K/uL ( @ 22:15)      [MICROBIOLOGY /  VIROLOGY:]  SARS-CoV-2: NotDetec (05 Dec 2023 01:14)    Babesia microti PCR, Bld (collected 05 Dec 2023 16:01)    Culture - Urine (collected 05 Dec 2023 01:00)  Source: Clean Catch Clean Catch (Midstream)  Final Report (06 Dec 2023 14:29):    10,000 - 49,000 CFU/mL Corynebacterium coyleae "Susceptibilities not    performed"    Lani-Barr Virus Serologic Test (23 @ 16:01)   Method: Liaison Chemiluminescent Immunoassay     Lani-Barr Virus Capsid Antigen IgG: Negative: Lani-Barr Virus VCA IgG Antibody   Lani-Barr Nuclear Antigen: Negative: Lani-Barr Virus NA IgG Antibody   Lani Barr Virus IgM Antibody: Positive: Lani-Barr Virus VCA IgM Antibody   Lani-Barr Virus Early Antigen: Positive: Lani-Barr Virus EA IgG Antibody   EBV Interpretation: See Note: Interpretation of EBV-Specific Antibody Results   EBV VCA IgG EIA: 17.3 U/mL  EBNA IgG EIA: <3.0 U/mL  EBV VCA IgM EIA: 55.9 U/mL  EBV EA Ab EIA: 27.6 U/mL        [PATHOLOGY]       [RADIOLOGY & ADDITIONAL STUDIES:]   CT Abdomen and Pelvis w/ IV Cont:   ACC: [id number] EXAM:  CT ABDOMEN AND PELVIS IC   ORDERED BY:  GUDELIA MARISCAL   PROCEDURE DATE:  2023    INTERPRETATION:  CLINICAL INFORMATION: 23 years old. Female. epigastric pain/jaundice.  COMPARISON: None.  CONTRAST/COMPLICATIONS:  ·	IV Contrast: Omnipaque 350  90 cc administered  10 cc discarded.  ·	Oral Contrast: NONE  ·	Complications: None reported at time of study completion  PROCEDURE:  ·	CT of the Abdomen and Pelvis was performed.  ·	Sagittal and coronal reformats were performed.  FINDINGS:  LOWER CHEST: Within normal limits.  LIVER: Within normal limits.  BILE DUCTS: Normal caliber.  GALLBLADDER: Within normal limits.  SPLEEN: Mild splenomegaly.  PANCREAS: Within normal limits.  ADRENALS: Within normal limits.  KIDNEYS/URETERS: Within normal limits.  BLADDER: Within normal limits.  REPRODUCTIVE ORGANS: Uterus and adnexa are grossly unremarkable.  BOWEL: No bowel obstruction. Appendix is unremarkable.  PERITONEUM: No ascites.  VESSELS: Normal caliber abdominal aorta.  RETROPERITONEUM/LYMPH NODES: No lymphadenopathy.  ABDOMINAL WALL: Within normal limits.  BONES: Within normal limits. Thoracolumbar levoscoliosis.    IMPRESSION:  No acute CT findings in the abdomen or pelvis. Mild splenomegaly.  --- End of Report ---  JOSH PRADHAN MD; Attending Radiologist  This document has been electronically signed. Dec  4 2023 11:04PM (23 @ 22:35)

## 2023-12-06 NOTE — CARE COORDINATION ASSESSMENT. - NSDCPLANREVIEW_GEN_ALL_CORE
Mom listened to conversation. Patient also discussed care with mom during phone encounter/Other/Patient

## 2023-12-06 NOTE — PROGRESS NOTE ADULT - PROBLEM SELECTOR PLAN 1
Pt presents to ED for jaundice and epigastric fullness x1 day   - On admission, Tbili 6.9, direct bili elevation, Alk phos 459, ,  , slight down trending   - CT abdomen and pelvis showed No acute CT findings in the abdomen or pelvis. Mild splenomegaly.  - RUQ US showed No cholelithiasis or sonographic evidence of acute cholecystitis. Question mild periportal hyperechogenicity.   - Hep C, A, B Negative   - + EBV   - Negative  HIV and CMV   - Negative lyme panel   - GI and ID follow up   -Monitor LFT's

## 2023-12-06 NOTE — PATIENT CHOICE NOTE. - NSPTCHOICESTATE_GEN_ALL_CORE
I have met with the patient and/or caregiver to discuss discharge goals and treatment plan. Patient and/or caregiver also provided with instructions on accessing the CMS Compare websites for additional information related to Post Acute Provider quality and resource use measures to assist them in evaluation of the providers and in selecting their post-acute provider of choice. Patient and caregiver were informed of the facilities that are owned and/or operated by [facility]. I have discussed with the patient the availability of in-network facilities and providers. Patient and caregiver provided with a list of post-acute providers whose services are appropriate to the discharge plans and patient needs.     For patient requiring durable medical equipment, patient and/or caregiver were informed that they have the right to request who provides the required equipment. I have met with the patient and/or caregiver to discuss discharge goals and treatment plan. Patient and/or caregiver also provided with instructions on accessing the CMS Compare websites for additional information related to Post Acute Provider quality and resource use measures to assist them in evaluation of the providers and in selecting their post-acute provider of choice. Patient and caregiver were informed of the facilities that are owned and/or operated by [facility]. I have discussed with the patient the availability of in-network facilities and providers. Patient and caregiver provided with a list of post-acute providers whose services are appropriate to the discharge plans and patient needs.     For patient requiring durable medical equipment, patient and/or caregiver were informed that they have the right to request who provides the required equipment.

## 2023-12-06 NOTE — PROGRESS NOTE ADULT - SUBJECTIVE AND OBJECTIVE BOX
[facility], Division of Infectious Diseases  SIRISHA Cho Y. Patel, S. Shah, G. Arpit  [phone]  after hours and weekends [phone]    Name: MICHELLE KAUR  Age: 23y  Gender: Female  MRN: [id number]    Interval History--  Notes reviewed  no new complaints  cough is better  less nausea    Allergies    sulfa drugs (Rash)  penicillin (Hives)    Intolerances        Medications--  Antibiotics:    Immunologic:    Other:  acetaminophen     Tablet .. PRN  aluminum hydroxide/magnesium hydroxide/simethicone Suspension PRN  melatonin PRN  ondansetron Injectable PRN  polyethylene glycol 3350  sodium chloride 0.9%.  ursodiol Capsule      Review of Systems--  A 10-point review of systems was obtained.     Pertinent positives and negatives--  Constitutional: No fevers. No Chills. No Rigors.   Cardiovascular: No chest pain. No palpitations.  Respiratory: No shortness of breath. No cough.  Gastrointestinal: No nausea or vomiting. No diarrhea or constipation.   Psychiatric: Pleasant. Appropriate affect.    Review of systems otherwise negative except as previously noted.    Physical Examination--  Vital Signs: T(F): 98.1 (12-06-23 @ 05:15), Max: 98.1 (12-05-23 @ 18:34)  HR: 80 (12-06-23 @ 05:15)  BP: 110/69 (12-06-23 @ 05:15)  RR: 18 (12-06-23 @ 05:15)  SpO2: 93% (12-06-23 @ 05:15)  Wt(kg): --  General: Nontoxic-appearing Female in no acute distress.  HEENT: AT/NC.   Neck: Not rigid. No sense of mass.  Nodes: None palpable.  Lungs: Clear bilaterally without rales, wheezing or rhonchi  Heart: Regular rate and rhythm. No Murmur.  Abdomen: Bowel sounds present and normoactive. Soft. Nondistended. Nontender.   Back: No spinal tenderness. No costovertebral angle tenderness.   Extremities: No cyanosis or clubbing. No edema.   Skin: Warm. Dry. Good turgor. No rash. No vasculitic stigmata.  Psychiatric: Appropriate affect and mood for situation.         Laboratory Studies--  CBC                        11.2   8.55  )-----------( 87       ( 06 Dec 2023 08:45 )             33.5       Chemistries  12-06    138  |  105  |  6<L>  ----------------------------<  81  3.6   |  26  |  0.57    Ca    7.8<L>      06 Dec 2023 08:45    TPro  6.4  /  Alb  2.5<L>  /  TBili  6.9<H>  /  DBili  x   /  AST  528<H>  /  ALT  681<H>  /  AlkPhos  583<H>  12-06      Culture Data    Babesia microti PCR, Bld (collected 05 Dec 2023 16:01)    Culture - Urine (collected 05 Dec 2023 01:00)  Source: Clean Catch Clean Catch (Midstream)  Final Report (06 Dec 2023 14:29):    10,000 - 49,000 CFU/mL Corynebacterium coyleae "Susceptibilities not    performed"             [facility], Division of Infectious Diseases  SIRISHA Cho Y. Patel, S. Shah, G. Arpit  [phone]  after hours and weekends [phone]    Name: MICHELLE KAUR  Age: 23y  Gender: Female  MRN: [id number]    Interval History--  Notes reviewed  no new complaints  cough is better  less nausea    Allergies    sulfa drugs (Rash)  penicillin (Hives)    Intolerances        Medications--  Antibiotics:    Immunologic:    Other:  acetaminophen     Tablet .. PRN  aluminum hydroxide/magnesium hydroxide/simethicone Suspension PRN  melatonin PRN  ondansetron Injectable PRN  polyethylene glycol 3350  sodium chloride 0.9%.  ursodiol Capsule      Review of Systems--  A 10-point review of systems was obtained.     Pertinent positives and negatives--  Constitutional: No fevers. No Chills. No Rigors.   Cardiovascular: No chest pain. No palpitations.  Respiratory: No shortness of breath. No cough.  Gastrointestinal: No nausea or vomiting. No diarrhea or constipation.   Psychiatric: Pleasant. Appropriate affect.    Review of systems otherwise negative except as previously noted.    Physical Examination--  Vital Signs: T(F): 98.1 (12-06-23 @ 05:15), Max: 98.1 (12-05-23 @ 18:34)  HR: 80 (12-06-23 @ 05:15)  BP: 110/69 (12-06-23 @ 05:15)  RR: 18 (12-06-23 @ 05:15)  SpO2: 93% (12-06-23 @ 05:15)  Wt(kg): --  General: Nontoxic-appearing Female in no acute distress.  HEENT: AT/NC.   Neck: Not rigid. No sense of mass.  Nodes: None palpable.  Lungs: Clear bilaterally without rales, wheezing or rhonchi  Heart: Regular rate and rhythm. No Murmur.  Abdomen: Bowel sounds present and normoactive. Soft. Nondistended. Nontender.   Back: No spinal tenderness. No costovertebral angle tenderness.   Extremities: No cyanosis or clubbing. No edema.   Skin: Warm. Dry. Good turgor. No rash. No vasculitic stigmata.  Psychiatric: Appropriate affect and mood for situation.         Laboratory Studies--  CBC                        11.2   8.55  )-----------( 87       ( 06 Dec 2023 08:45 )             33.5       Chemistries  12-06    138  |  105  |  6<L>  ----------------------------<  81  3.6   |  26  |  0.57    Ca    7.8<L>      06 Dec 2023 08:45    TPro  6.4  /  Alb  2.5<L>  /  TBili  6.9<H>  /  DBili  x   /  AST  528<H>  /  ALT  681<H>  /  AlkPhos  583<H>  12-06      Culture Data    Babesia microti PCR, Bld (collected 05 Dec 2023 16:01)    Culture - Urine (collected 05 Dec 2023 01:00)  Source: Clean Catch Clean Catch (Midstream)  Final Report (06 Dec 2023 14:29):    10,000 - 49,000 CFU/mL Corynebacterium coyleae "Susceptibilities not    performed"

## 2023-12-06 NOTE — PROGRESS NOTE ADULT - ASSESSMENT
22 yo female with no PMHx presents to the ED for jaundice and epigastric fullness x1 day. Admitted for elevated transaminases with associated jaundice.     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 Admitted for elevated transaminases with associated jaundice.     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

## 2023-12-07 ENCOUNTER — TRANSCRIPTION ENCOUNTER (OUTPATIENT)
Age: 23
End: 2023-12-07

## 2023-12-07 LAB
A PHAGOCYTOPH DNA BLD QL NAA+PROBE: NEGATIVE — SIGNIFICANT CHANGE UP
A PHAGOCYTOPH DNA BLD QL NAA+PROBE: NEGATIVE — SIGNIFICANT CHANGE UP
ALBUMIN SERPL ELPH-MCNC: 2.7 G/DL — LOW (ref 3.3–5)
ALBUMIN SERPL ELPH-MCNC: 2.7 G/DL — LOW (ref 3.3–5)
ALP SERPL-CCNC: 688 U/L — HIGH (ref 40–120)
ALP SERPL-CCNC: 688 U/L — HIGH (ref 40–120)
ALT FLD-CCNC: 564 U/L — HIGH (ref 12–78)
ALT FLD-CCNC: 564 U/L — HIGH (ref 12–78)
ANA PAT FLD IF-IMP: ABNORMAL
ANA PAT FLD IF-IMP: ABNORMAL
ANA TITR SER: ABNORMAL
ANA TITR SER: ABNORMAL
ANION GAP SERPL CALC-SCNC: 6 MMOL/L — SIGNIFICANT CHANGE UP (ref 5–17)
ANION GAP SERPL CALC-SCNC: 6 MMOL/L — SIGNIFICANT CHANGE UP (ref 5–17)
AST SERPL-CCNC: 388 U/L — HIGH (ref 15–37)
AST SERPL-CCNC: 388 U/L — HIGH (ref 15–37)
BASOPHILS # BLD AUTO: 0.1 K/UL — SIGNIFICANT CHANGE UP (ref 0–0.2)
BASOPHILS # BLD AUTO: 0.1 K/UL — SIGNIFICANT CHANGE UP (ref 0–0.2)
BASOPHILS NFR BLD AUTO: 1.2 % — SIGNIFICANT CHANGE UP (ref 0–2)
BASOPHILS NFR BLD AUTO: 1.2 % — SIGNIFICANT CHANGE UP (ref 0–2)
BILIRUB SERPL-MCNC: 7.3 MG/DL — HIGH (ref 0.2–1.2)
BILIRUB SERPL-MCNC: 7.3 MG/DL — HIGH (ref 0.2–1.2)
BUN SERPL-MCNC: 6 MG/DL — LOW (ref 7–23)
BUN SERPL-MCNC: 6 MG/DL — LOW (ref 7–23)
CALCIUM SERPL-MCNC: 8.2 MG/DL — LOW (ref 8.5–10.1)
CALCIUM SERPL-MCNC: 8.2 MG/DL — LOW (ref 8.5–10.1)
CHLORIDE SERPL-SCNC: 104 MMOL/L — SIGNIFICANT CHANGE UP (ref 96–108)
CHLORIDE SERPL-SCNC: 104 MMOL/L — SIGNIFICANT CHANGE UP (ref 96–108)
CO2 SERPL-SCNC: 29 MMOL/L — SIGNIFICANT CHANGE UP (ref 22–31)
CO2 SERPL-SCNC: 29 MMOL/L — SIGNIFICANT CHANGE UP (ref 22–31)
CREAT SERPL-MCNC: 0.58 MG/DL — SIGNIFICANT CHANGE UP (ref 0.5–1.3)
CREAT SERPL-MCNC: 0.58 MG/DL — SIGNIFICANT CHANGE UP (ref 0.5–1.3)
CRP SERPL-MCNC: 12 MG/L — HIGH
CRP SERPL-MCNC: 12 MG/L — HIGH
E CHAFFEENSIS DNA BLD QL NAA+PROBE: NEGATIVE — SIGNIFICANT CHANGE UP
E CHAFFEENSIS DNA BLD QL NAA+PROBE: NEGATIVE — SIGNIFICANT CHANGE UP
E EWINGII DNA SPEC QL NAA+PROBE: NEGATIVE — SIGNIFICANT CHANGE UP
E EWINGII DNA SPEC QL NAA+PROBE: NEGATIVE — SIGNIFICANT CHANGE UP
EGFR: 131 ML/MIN/1.73M2 — SIGNIFICANT CHANGE UP
EGFR: 131 ML/MIN/1.73M2 — SIGNIFICANT CHANGE UP
EHRLICHIA DNA SPEC QL NAA+PROBE: NEGATIVE — SIGNIFICANT CHANGE UP
EHRLICHIA DNA SPEC QL NAA+PROBE: NEGATIVE — SIGNIFICANT CHANGE UP
EOSINOPHIL # BLD AUTO: 0.04 K/UL — SIGNIFICANT CHANGE UP (ref 0–0.5)
EOSINOPHIL # BLD AUTO: 0.04 K/UL — SIGNIFICANT CHANGE UP (ref 0–0.5)
EOSINOPHIL NFR BLD AUTO: 0.5 % — SIGNIFICANT CHANGE UP (ref 0–6)
EOSINOPHIL NFR BLD AUTO: 0.5 % — SIGNIFICANT CHANGE UP (ref 0–6)
FERRITIN SERPL-MCNC: 212 NG/ML — HIGH (ref 15–150)
FERRITIN SERPL-MCNC: 212 NG/ML — HIGH (ref 15–150)
FOLATE SERPL-MCNC: >20 NG/ML — SIGNIFICANT CHANGE UP
FOLATE SERPL-MCNC: >20 NG/ML — SIGNIFICANT CHANGE UP
GLUCOSE SERPL-MCNC: 103 MG/DL — HIGH (ref 70–99)
GLUCOSE SERPL-MCNC: 103 MG/DL — HIGH (ref 70–99)
HAPTOGLOB SERPL-MCNC: 103 MG/DL — SIGNIFICANT CHANGE UP (ref 34–200)
HAPTOGLOB SERPL-MCNC: 103 MG/DL — SIGNIFICANT CHANGE UP (ref 34–200)
HCT VFR BLD CALC: 37.3 % — SIGNIFICANT CHANGE UP (ref 34.5–45)
HCT VFR BLD CALC: 37.3 % — SIGNIFICANT CHANGE UP (ref 34.5–45)
HGB BLD-MCNC: 12.5 G/DL — SIGNIFICANT CHANGE UP (ref 11.5–15.5)
HGB BLD-MCNC: 12.5 G/DL — SIGNIFICANT CHANGE UP (ref 11.5–15.5)
IMM GRANULOCYTES NFR BLD AUTO: 1.5 % — HIGH (ref 0–0.9)
IMM GRANULOCYTES NFR BLD AUTO: 1.5 % — HIGH (ref 0–0.9)
IRON SATN MFR SERPL: 23 % — SIGNIFICANT CHANGE UP (ref 14–50)
IRON SATN MFR SERPL: 23 % — SIGNIFICANT CHANGE UP (ref 14–50)
IRON SATN MFR SERPL: 54 UG/DL — SIGNIFICANT CHANGE UP (ref 30–160)
IRON SATN MFR SERPL: 54 UG/DL — SIGNIFICANT CHANGE UP (ref 30–160)
LDH SERPL L TO P-CCNC: 473 U/L — HIGH (ref 50–242)
LDH SERPL L TO P-CCNC: 473 U/L — HIGH (ref 50–242)
LYMPHOCYTES # BLD AUTO: 5.38 K/UL — HIGH (ref 1–3.3)
LYMPHOCYTES # BLD AUTO: 5.38 K/UL — HIGH (ref 1–3.3)
LYMPHOCYTES # BLD AUTO: 66.7 % — HIGH (ref 13–44)
LYMPHOCYTES # BLD AUTO: 66.7 % — HIGH (ref 13–44)
MCHC RBC-ENTMCNC: 28.7 PG — SIGNIFICANT CHANGE UP (ref 27–34)
MCHC RBC-ENTMCNC: 28.7 PG — SIGNIFICANT CHANGE UP (ref 27–34)
MCHC RBC-ENTMCNC: 33.5 GM/DL — SIGNIFICANT CHANGE UP (ref 32–36)
MCHC RBC-ENTMCNC: 33.5 GM/DL — SIGNIFICANT CHANGE UP (ref 32–36)
MCV RBC AUTO: 85.7 FL — SIGNIFICANT CHANGE UP (ref 80–100)
MCV RBC AUTO: 85.7 FL — SIGNIFICANT CHANGE UP (ref 80–100)
MONOCYTES # BLD AUTO: 0.43 K/UL — SIGNIFICANT CHANGE UP (ref 0–0.9)
MONOCYTES # BLD AUTO: 0.43 K/UL — SIGNIFICANT CHANGE UP (ref 0–0.9)
MONOCYTES NFR BLD AUTO: 5.3 % — SIGNIFICANT CHANGE UP (ref 2–14)
MONOCYTES NFR BLD AUTO: 5.3 % — SIGNIFICANT CHANGE UP (ref 2–14)
NEUTROPHILS # BLD AUTO: 2 K/UL — SIGNIFICANT CHANGE UP (ref 1.8–7.4)
NEUTROPHILS # BLD AUTO: 2 K/UL — SIGNIFICANT CHANGE UP (ref 1.8–7.4)
NEUTROPHILS NFR BLD AUTO: 24.8 % — LOW (ref 43–77)
NEUTROPHILS NFR BLD AUTO: 24.8 % — LOW (ref 43–77)
NRBC # BLD: 0 /100 WBCS — SIGNIFICANT CHANGE UP (ref 0–0)
NRBC # BLD: 0 /100 WBCS — SIGNIFICANT CHANGE UP (ref 0–0)
PLATELET # BLD AUTO: SIGNIFICANT CHANGE UP K/UL (ref 150–400)
PLATELET # BLD AUTO: SIGNIFICANT CHANGE UP K/UL (ref 150–400)
POTASSIUM SERPL-MCNC: 3.8 MMOL/L — SIGNIFICANT CHANGE UP (ref 3.5–5.3)
POTASSIUM SERPL-MCNC: 3.8 MMOL/L — SIGNIFICANT CHANGE UP (ref 3.5–5.3)
POTASSIUM SERPL-SCNC: 3.8 MMOL/L — SIGNIFICANT CHANGE UP (ref 3.5–5.3)
POTASSIUM SERPL-SCNC: 3.8 MMOL/L — SIGNIFICANT CHANGE UP (ref 3.5–5.3)
PROT SERPL-MCNC: 6.9 G/DL — SIGNIFICANT CHANGE UP (ref 6–8.3)
PROT SERPL-MCNC: 6.9 G/DL — SIGNIFICANT CHANGE UP (ref 6–8.3)
RBC # BLD: 4.35 M/UL — SIGNIFICANT CHANGE UP (ref 3.8–5.2)
RBC # BLD: 4.35 M/UL — SIGNIFICANT CHANGE UP (ref 3.8–5.2)
RBC # FLD: 13.2 % — SIGNIFICANT CHANGE UP (ref 10.3–14.5)
RBC # FLD: 13.2 % — SIGNIFICANT CHANGE UP (ref 10.3–14.5)
SODIUM SERPL-SCNC: 139 MMOL/L — SIGNIFICANT CHANGE UP (ref 135–145)
SODIUM SERPL-SCNC: 139 MMOL/L — SIGNIFICANT CHANGE UP (ref 135–145)
TIBC SERPL-MCNC: 233 UG/DL — SIGNIFICANT CHANGE UP (ref 220–430)
TIBC SERPL-MCNC: 233 UG/DL — SIGNIFICANT CHANGE UP (ref 220–430)
UIBC SERPL-MCNC: 179 UG/DL — SIGNIFICANT CHANGE UP (ref 110–370)
UIBC SERPL-MCNC: 179 UG/DL — SIGNIFICANT CHANGE UP (ref 110–370)
VIT B12 SERPL-MCNC: 1336 PG/ML — HIGH (ref 232–1245)
VIT B12 SERPL-MCNC: 1336 PG/ML — HIGH (ref 232–1245)
WBC # BLD: 8.07 K/UL — SIGNIFICANT CHANGE UP (ref 3.8–10.5)
WBC # BLD: 8.07 K/UL — SIGNIFICANT CHANGE UP (ref 3.8–10.5)
WBC # FLD AUTO: 8.07 K/UL — SIGNIFICANT CHANGE UP (ref 3.8–10.5)
WBC # FLD AUTO: 8.07 K/UL — SIGNIFICANT CHANGE UP (ref 3.8–10.5)

## 2023-12-07 PROCEDURE — 99233 SBSQ HOSP IP/OBS HIGH 50: CPT

## 2023-12-07 RX ORDER — POLYETHYLENE GLYCOL 3350 17 G/17G
17 POWDER, FOR SOLUTION ORAL DAILY
Refills: 0 | Status: DISCONTINUED | OUTPATIENT
Start: 2023-12-07 | End: 2023-12-08

## 2023-12-07 RX ORDER — BENZOCAINE AND MENTHOL 5; 1 G/100ML; G/100ML
1 LIQUID ORAL THREE TIMES A DAY
Refills: 0 | Status: DISCONTINUED | OUTPATIENT
Start: 2023-12-07 | End: 2023-12-07

## 2023-12-07 RX ORDER — SODIUM CHLORIDE 0.65 %
1 AEROSOL, SPRAY (ML) NASAL EVERY 6 HOURS
Refills: 0 | Status: DISCONTINUED | OUTPATIENT
Start: 2023-12-07 | End: 2023-12-08

## 2023-12-07 RX ORDER — BENZOCAINE AND MENTHOL 5; 1 G/100ML; G/100ML
1 LIQUID ORAL EVERY 4 HOURS
Refills: 0 | Status: DISCONTINUED | OUTPATIENT
Start: 2023-12-07 | End: 2023-12-08

## 2023-12-07 RX ORDER — FLUTICASONE PROPIONATE 50 MCG
2 SPRAY, SUSPENSION NASAL
Refills: 0 | Status: DISCONTINUED | OUTPATIENT
Start: 2023-12-07 | End: 2023-12-08

## 2023-12-07 RX ORDER — ACETAMINOPHEN 500 MG
2 TABLET ORAL
Qty: 0 | Refills: 0 | DISCHARGE
Start: 2023-12-07

## 2023-12-07 RX ORDER — PREGABALIN 225 MG/1
1 CAPSULE ORAL
Qty: 0 | Refills: 0 | DISCHARGE
Start: 2023-12-07

## 2023-12-07 RX ORDER — URSODIOL 250 MG/1
1 TABLET, FILM COATED ORAL
Qty: 0 | Refills: 0 | DISCHARGE
Start: 2023-12-07

## 2023-12-07 RX ORDER — PSEUDOEPHEDRINE HCL 30 MG
30 TABLET ORAL DAILY
Refills: 0 | Status: DISCONTINUED | OUTPATIENT
Start: 2023-12-07 | End: 2023-12-08

## 2023-12-07 RX ADMIN — URSODIOL 300 MILLIGRAM(S): 250 TABLET, FILM COATED ORAL at 17:12

## 2023-12-07 RX ADMIN — Medication 2 SPRAY(S): at 17:12

## 2023-12-07 RX ADMIN — PREGABALIN 1000 MICROGRAM(S): 225 CAPSULE ORAL at 11:21

## 2023-12-07 RX ADMIN — BENZOCAINE AND MENTHOL 1 LOZENGE: 5; 1 LIQUID ORAL at 19:46

## 2023-12-07 RX ADMIN — URSODIOL 300 MILLIGRAM(S): 250 TABLET, FILM COATED ORAL at 05:11

## 2023-12-07 RX ADMIN — Medication 1 TABLET(S): at 11:21

## 2023-12-07 NOTE — PROGRESS NOTE ADULT - ASSESSMENT
[ASSESSMENT and  PLAN]  D82.3    EBV infection  D69.6    Thrombocytopenia  J12.12   Parainfluenza  R17       Jaundice  D63.8    Anemia chronic disease /acute inflammation  R16.1    Splenomegaly due to EBV  R59. 9   Enlarged lymph nodes, unspecified.    22yo F with now major PMH, admitted with acute hepatitis jaundice  ·	,   +Parainflunza infection on RVP  EBV. Found also with EBV infection with serologies reflecting recent infection,   --EBV Capsid IgG NEG  +EBV Nuclear Ag POS  +EBV IgM POS  +EBV Early Ag POS  +EBV VCA IgG POS  --EBV NA IgG NEG  +EBV VAC IgM POS  +EBV EA Ab POS    +Sick contacts  Mother with COVID 1-2mo ago  Younger sister with non-specific viral illness recently.   2-3+wk of mild fatigue  1wk hx of recent URI sx starting last week Wed, initially with feverl.     Thrombocytopenia likely due to hepatitis due to EBV and parainfluenza infection.   Mild splenomegaly due to EBV.   ·	Doubt due to parainfluenza  ·	No clear family hx of Thalassemia  ·	No other sx for LPD  ·	Exam without any DOUGLAS, palpable. except Mild mobile soft ~ 1cm R subemntal at level IIA    Mild anemia due to "chronic disease" / acute illness /inflammation    Family hx of Wheelwright Disease, [Father]  No personal hx of Wheelwright Dz  no fever  LFTs minimally better      RECOMMENDATIONS  Transfuse PRBC as clinically indicated.   Transfuse PRBC if Hgb <7.0 or if symptomatic.   Follow CBC    Check Anemia studies.      Ferritin, Iron studies     B12, Folate     ESR, CRP  Start MV tabs and B12 PO empirically post labs.     Supportive measures for viral infection      OOB as sha .     discussed with Dr. Parra, patient and mother    Follow in office in 6wks to re-eval, for resolution of cytopenia, mild splenomegaly and R cervical IIA LN.         [ASSESSMENT and  PLAN]  D82.3    EBV infection  D69.6    Thrombocytopenia  J12.12   Parainfluenza  R17       Jaundice  D63.8    Anemia chronic disease /acute inflammation  R16.1    Splenomegaly due to EBV  R59. 9   Enlarged lymph nodes, unspecified.    24yo F with now major PMH, admitted with acute hepatitis jaundice  ·	,   +Parainflunza infection on RVP  EBV. Found also with EBV infection with serologies reflecting recent infection,   --EBV Capsid IgG NEG  +EBV Nuclear Ag POS  +EBV IgM POS  +EBV Early Ag POS  +EBV VCA IgG POS  --EBV NA IgG NEG  +EBV VAC IgM POS  +EBV EA Ab POS    +Sick contacts  Mother with COVID 1-2mo ago  Younger sister with non-specific viral illness recently.   2-3+wk of mild fatigue  1wk hx of recent URI sx starting last week Wed, initially with feverl.     Thrombocytopenia likely due to hepatitis due to EBV and parainfluenza infection.   Mild splenomegaly due to EBV.   ·	Doubt due to parainfluenza  ·	No clear family hx of Thalassemia  ·	No other sx for LPD  ·	Exam without any DOUGLAS, palpable. except Mild mobile soft ~ 1cm R subemntal at level IIA    Mild anemia due to "chronic disease" / acute illness /inflammation    Family hx of Mcgrew Disease, [Father]  No personal hx of Mcgrew Dz  no fever  LFTs minimally better      RECOMMENDATIONS  Transfuse PRBC as clinically indicated.   Transfuse PRBC if Hgb <7.0 or if symptomatic.   Follow CBC    Check Anemia studies.      Ferritin, Iron studies     B12, Folate     ESR, CRP  Start MV tabs and B12 PO empirically post labs.     Supportive measures for viral infection      OOB as sha .     discussed with Dr. Parra, patient and mother    Follow in office in 6wks to re-eval, for resolution of cytopenia, mild splenomegaly and R cervical IIA LN.

## 2023-12-07 NOTE — PROGRESS NOTE ADULT - PROBLEM SELECTOR PLAN 2
Pt with URI symptoms x1 week; Tmax 102; afebrile x48 hrs   - RVP + Parainfluenz->abnormal LFT is uncommon but possible   - symptomatic management
Pt with URI symptoms x1 week; Tmax 102; afebrile x48 hrs   - RVP + Parainfluenz->abnormal LFT is uncommon but possible   - symptomatic management
Pt with URI symptoms x1 week; Tmax 102; afebrile x48 hrs   - RVP + Parainfluenz->abnormal LFT is uncommon but possible   - symptomatic management. Lozenges PRN Sore throat, Saline Nasal spray, sudefed PRN.

## 2023-12-07 NOTE — PROGRESS NOTE ADULT - SUBJECTIVE AND OBJECTIVE BOX
Patient is a 23y old  Female who presents with a chief complaint of Jaundice/Elevated LFTs (06 Dec 2023 16:43)       INTERVAL HPI/OVERNIGHT EVENTS: Patient seen and examined at bedside. Events noted. patient c/o nasal congestion, sore throat. Denies cough, chest pain, sob, abdominal pain, N/V/D    MEDICATIONS  (STANDING):  cyanocobalamin 1000 MICROGram(s) Oral daily  fluticasone propionate 50 MICROgram(s)/spray Nasal Spray 2 Spray(s) Both Nostrils two times a day  multivitamin 1 Tablet(s) Oral daily  sodium chloride 0.9%. 1000 milliLiter(s) (80 mL/Hr) IV Continuous <Continuous>  ursodiol Capsule 300 milliGRAM(s) Oral every 12 hours    MEDICATIONS  (PRN):  acetaminophen     Tablet .. 650 milliGRAM(s) Oral every 6 hours PRN Temp greater or equal to 38C (100.4F), Mild Pain (1 - 3)  aluminum hydroxide/magnesium hydroxide/simethicone Suspension 30 milliLiter(s) Oral every 4 hours PRN Dyspepsia  benzocaine/menthol Lozenge 1 Lozenge Oral every 4 hours PRN Sore Throat  melatonin 3 milliGRAM(s) Oral at bedtime PRN Insomnia  ondansetron Injectable 4 milliGRAM(s) IV Push every 8 hours PRN Nausea and/or Vomiting  pseudoephedrine 30 milliGRAM(s) Oral daily PRN nasal congestion  sodium chloride 0.65% Nasal 1 Spray(s) Both Nostrils every 6 hours PRN Nasal Congestion      Allergies    sulfa drugs (Rash)  penicillin (Hives)    Intolerances        REVIEW OF SYSTEMS:  Per HPI. All other ROS noted Negative   Vital Signs Last 24 Hrs  T(C): 36.6 (07 Dec 2023 04:56), Max: 36.8 (06 Dec 2023 19:53)  T(F): 97.8 (07 Dec 2023 04:56), Max: 98.2 (06 Dec 2023 19:53)  HR: 90 (07 Dec 2023 04:56) (80 - 90)  BP: 104/66 (07 Dec 2023 04:56) (104/66 - 117/75)  BP(mean): --  RR: 17 (07 Dec 2023 04:56) (16 - 17)  SpO2: 92% (07 Dec 2023 04:56) (92% - 94%)    Parameters below as of 07 Dec 2023 04:56  Patient On (Oxygen Delivery Method): room air        PHYSICAL EXAM:  GENERAL: NAD, well-groomed, well-developed  HEAD:  Atraumatic, Normocephalic  EYES: EOMI, PERRLA, + Scleral icterus   ENMT: No tonsillar erythema, exudates, or enlargement; Moist mucous membranes  NECK: Supple, No JVD, Normal thyroid  NERVOUS SYSTEM:  Alert & Oriented X3, Good concentration; Motor Strength 5/5 B/L upper and lower extremities  CHEST/LUNG: Clear to auscultation bilaterally; No rales, rhonchi, wheezing, or rubs  HEART: Regular rate and rhythm; No murmurs  ABDOMEN: Soft, Nontender, Nondistended; Bowel sounds present  EXTREMITIES:  2+ Peripheral Pulses, No clubbing, cyanosis, or edema  LYMPH: No lymphadenopathy noted  SKIN: No rashes or lesions    LABS:      Ca    7.8        06 Dec 2023 08:45    TPro  x      /  Alb  x      /  TBili  7.2    /  DBili  6.0    /  AST  x      /  ALT  x      /  AlkPhos  x      06 Dec 2023 19:20      CAPILLARY BLOOD GLUCOSE        BLOOD CULTURE  12-05 @ 01:00   10,000 - 49,000 CFU/mL Corynebacterium coyleae "Susceptibilities not  performed"  --  --    RADIOLOGY & ADDITIONAL TESTS:    Imaging Personally Reviewed:  [ ] YES     Consultant(s) Notes Reviewed:      Care Discussed with Consultants/Other Providers:

## 2023-12-07 NOTE — DISCHARGE NOTE PROVIDER - HOSPITAL COURSE
24 yo female with no PMHx presents to the ED for jaundice and epigastric fullness x1 day. Admitted for elevated transaminases with associated jaundice probably secondary to infectious mononucleosis.        Problem/Plan - 1:  ·  Problem: Elevated transaminase level/ Jaundice   - CT abdomen and pelvis showed No acute CT findings in the abdomen or pelvis. Mild splenomegaly.  - RUQ US showed No cholelithiasis or sonographic evidence of acute cholecystitis. Question mild periportal hyperechogenicity.   - Hep C, A, B Negative   - + EBV , likely acute Mono   - Negative  HIV and CMV   - Negative lyme panel   - GI and ID saw patient        Problem/Plan - 2:  ·  Problem: Parainfluenza infection.   ·  Plan: Pt with URI symptoms   - symptomatic management. Lozenges PRN Sore throat, Saline Nasal spray, sudefed PRN    Seen and examined the day of discharge. VSS    GENERAL: NAD, well-groomed, well-developed  HEAD:  Atraumatic, Normocephalic  EYES: EOMI, PERRLA, + Scleral icterus   ENMT: No tonsillar erythema, exudates, or enlargement; Moist mucous membranes  NECK: Supple, No JVD, Normal thyroid  NERVOUS SYSTEM:  Alert & Oriented X3, Good concentration; Motor Strength 5/5 B/L upper and lower extremities  CHEST/LUNG: Clear to auscultation bilaterally; No rales, rhonchi, wheezing, or rubs  HEART: Regular rate and rhythm; No murmurs  ABDOMEN: Soft, Nontender, Nondistended; Bowel sounds present  EXTREMITIES:  2+ Peripheral Pulses, No clubbing, cyanosis, or edema  LYMPH: No lymphadenopathy noted  SKIN: No rashes or lesions    Total discharge time spent 45 minutes, including medication reconciliation, counseling and education, follow up with consultants    22 yo female with no PMHx presents to the ED for jaundice and epigastric fullness x1 day. Admitted for elevated transaminases with associated jaundice probably secondary to infectious mononucleosis.        Problem/Plan - 1:  ·  Problem: Elevated transaminase level/ Jaundice   - CT abdomen and pelvis showed No acute CT findings in the abdomen or pelvis. Mild splenomegaly.  - RUQ US showed No cholelithiasis or sonographic evidence of acute cholecystitis. Question mild periportal hyperechogenicity.   - Hep C, A, B Negative   - + EBV , likely acute Mono   - Negative  HIV and CMV   - Negative lyme panel   - GI and ID saw patient and suggested symptoms probably secondary to Acute Infectious Mononucleosis        Problem/Plan - 2:  ·  Problem: Parainfluenza infection.   ·  Plan: Pt with URI symptoms   - symptomatic management. Lozenges PRN Sore throat, Saline Nasal spray, sudefed PRN    Seen and examined the day of discharge. VSS    GENERAL: NAD, well-groomed, well-developed  HEAD:  Atraumatic, Normocephalic  EYES: EOMI, PERRLA, + Scleral icterus   ENMT: No tonsillar erythema, exudates, or enlargement; Moist mucous membranes  NECK: Supple, No JVD, Normal thyroid  NERVOUS SYSTEM:  Alert & Oriented X3, Good concentration; Motor Strength 5/5 B/L upper and lower extremities  CHEST/LUNG: Clear to auscultation bilaterally; No rales, rhonchi, wheezing, or rubs  HEART: Regular rate and rhythm; No murmurs  ABDOMEN: Soft, Nontender, Nondistended; Bowel sounds present  EXTREMITIES:  2+ Peripheral Pulses, No clubbing, cyanosis, or edema  LYMPH: No lymphadenopathy noted  SKIN: No rashes or lesions    Total discharge time spent 45 minutes, including medication reconciliation, counseling and education, follow up with consultants    24 yo female with no PMHx presents to the ED for jaundice and epigastric fullness x1 day. Admitted for elevated transaminases with associated jaundice probably secondary to infectious mononucleosis.        Problem/Plan - 1:  ·  Problem: Elevated transaminase level/ Jaundice   - CT abdomen and pelvis showed No acute CT findings in the abdomen or pelvis. Mild splenomegaly.  - RUQ US showed No cholelithiasis or sonographic evidence of acute cholecystitis. Question mild periportal hyperechogenicity.   - Hep C, A, B Negative   - + EBV , likely acute Mono   - Negative  HIV and CMV   - Negative lyme panel   - GI and ID saw patient and suggested symptoms probably secondary to Acute Infectious Mononucleosis        Problem/Plan - 2:  ·  Problem: Parainfluenza infection.   ·  Plan: Pt with URI symptoms   - symptomatic management. Lozenges PRN Sore throat, Saline Nasal spray, sudefed PRN    Seen and examined the day of discharge. VSS    GENERAL: NAD, well-groomed, well-developed  HEAD:  Atraumatic, Normocephalic  EYES: EOMI, PERRLA, + Scleral icterus   ENMT: No tonsillar erythema, exudates, or enlargement; Moist mucous membranes  NECK: Supple, No JVD, Normal thyroid  NERVOUS SYSTEM:  Alert & Oriented X3, Good concentration; Motor Strength 5/5 B/L upper and lower extremities  CHEST/LUNG: Clear to auscultation bilaterally; No rales, rhonchi, wheezing, or rubs  HEART: Regular rate and rhythm; No murmurs  ABDOMEN: Soft, Nontender, Nondistended; Bowel sounds present  EXTREMITIES:  2+ Peripheral Pulses, No clubbing, cyanosis, or edema  LYMPH: No lymphadenopathy noted  SKIN: No rashes or lesions    Total discharge time spent 45 minutes, including medication reconciliation, counseling and education, follow up with consultants

## 2023-12-07 NOTE — PROGRESS NOTE ADULT - PROBLEM SELECTOR PLAN 1
Pt presents to ED for jaundice and epigastric fullness x1 day   - On admission, Tbili 6.9, direct bili elevation, Alk phos 459, ,  , slight down trending   - CT abdomen and pelvis showed No acute CT findings in the abdomen or pelvis. Mild splenomegaly.  - RUQ US showed No cholelithiasis or sonographic evidence of acute cholecystitis. Question mild periportal hyperechogenicity.   - Hep C, A, B Negative   - + EBV , likely acute Mono   - Negative  HIV and CMV   - Negative lyme panel   - GI and ID follow up   -Monitor LFT's

## 2023-12-07 NOTE — DISCHARGE NOTE PROVIDER - NSDCMRMEDTOKEN_GEN_ALL_CORE_FT
acetaminophen 325 mg oral tablet: 2 tab(s) orally every 6 hours As needed Temp greater or equal to 38C (100.4F), Mild Pain (1 - 3)  cyanocobalamin 1000 mcg oral tablet: 1 tab(s) orally once a day  ursodiol 300 mg oral capsule: 1 cap(s) orally every 12 hours   cyanocobalamin 1000 mcg oral tablet: 1 tab(s) orally once a day   ursodiol 300 mg oral capsule: 1 cap(s) orally every 12 hours MDD: 2 tablets

## 2023-12-07 NOTE — PROGRESS NOTE ADULT - PROBLEM SELECTOR PLAN 3
Yes
Replete as needed
Replete as needed
K 3.3 on admission  - s/p KCL 40meq x1  - monitor and replete lytes as necessary

## 2023-12-07 NOTE — PROGRESS NOTE ADULT - ASSESSMENT
a/p inc lfts  +parainfluenza  +EBV    EBV noted, suspect elevated LFT 2/2 mono  ID following  add ursodiol 300bid  supportive care   inc po intake  gerd precautions  d/w patient and family

## 2023-12-07 NOTE — DISCHARGE NOTE PROVIDER - CARE PROVIDER_API CALL
Shan Finley  Gastroenterology  [address] [place] [address]  Phone: [phone]  Fax: [phone]  Follow Up Time:     Shantell Vargas  Infectious Disease  [address] [place] [address], [facility] [address]  [place] [address]  Phone: [phone]  Fax: [phone]  Follow Up Time:    Shan Finley  Gastroenterology  [address] [place] [address]  Phone: [phone]  Fax: [phone]  Follow Up Time:     Shantell Vargas  Infectious Disease  [address] [place] [address], [facility] [address]  [place] [address]  Phone: [phone]  Fax: [phone]  Follow Up Time:

## 2023-12-07 NOTE — DISCHARGE NOTE PROVIDER - PROVIDER TOKENS
PROVIDER:[TOKEN:[[id number]]],PROVIDER:[TOKEN:[[id number]]] PROVIDER:[TOKEN:[[id number]]],PROVIDER:[TOKEN:[[id number]]]

## 2023-12-07 NOTE — CHART NOTE - NSCHARTNOTEFT_GEN_A_CORE
Called by RN for pt c/o of throat pain when swallowing and nasal congestion. Pt seen and examined at bedside. Ordered Flonase BID and cepacol lozenge.
Called by RN for c/o of abdominal tightness. Pt seen and examined at bedside. Pt states she had abdominal tightness that felt like gas and passed flatulence with resolution of abdominal tightness. She denies any active nausea and abdominal pain. Abdominal exam belly soft, NT, ND. RN to call with any changes.

## 2023-12-07 NOTE — DISCHARGE NOTE PROVIDER - NSDCCPCAREPLAN_GEN_ALL_CORE_FT
PRINCIPAL DISCHARGE DIAGNOSIS  Diagnosis: Abnormal LFTs  Assessment and Plan of Treatment: Likey secondary to Acute infectious Mononucleosis   URI due to Parainfluenza: Symptomatic management as dicussed  f/u with PCP in 2-3 days  f/u with GI     PRINCIPAL DISCHARGE DIAGNOSIS  Diagnosis: Abnormal LFTs  Assessment and Plan of Treatment: Likey secondary to Acute infectious Mononucleosis   URI due to Parainfluenza: Symptomatic management as dicussed  Per GI Continue Ursidio 300mg PO BID x 14 days   f/u with PCP in 2-3 days  f/u with GI     PRINCIPAL DISCHARGE DIAGNOSIS  Diagnosis: Abnormal LFTs  Assessment and Plan of Treatment: Likey secondary to Acute infectious Mononucleosis   URI due to Parainfluenza: Symptomatic management as dicussed  Per GI Continue Ursidio 300mg PO BID x 14 days   f/u with PCP in 2-3 days  f/u with GI  f/u with ID Dr. Vargas     PRINCIPAL DISCHARGE DIAGNOSIS  Diagnosis: Abnormal LFTs  Assessment and Plan of Treatment: Likey secondary to Acute infectious Mononucleosis   URI due to Parainfluenza: Symptomatic management as dicussed  Per GI Continue Ursidio 300mg PO BID x 14 days   f/u with PCP in 3-5 days.  Recommend blood work to make sure liver enzymes are improving  Avoid Tylenol until liver enzymes improved   Oral hydration encouraged   f/u with ID Dr. Vargas as recommended by her

## 2023-12-07 NOTE — DISCHARGE NOTE PROVIDER - CARE PROVIDERS DIRECT ADDRESSES
,[email or web address] [email or web address],DirectAddress_Unknown

## 2023-12-07 NOTE — PROGRESS NOTE ADULT - SUBJECTIVE AND OBJECTIVE BOX
Interval History:  no new complaints  Chart reviewed and events noted;   Overnight events:    MEDICATIONS  (STANDING):  cyanocobalamin 1000 MICROGram(s) Oral daily  fluticasone propionate 50 MICROgram(s)/spray Nasal Spray 2 Spray(s) Both Nostrils two times a day  multivitamin 1 Tablet(s) Oral daily  sodium chloride 0.9%. 1000 milliLiter(s) (80 mL/Hr) IV Continuous <Continuous>  ursodiol Capsule 300 milliGRAM(s) Oral every 12 hours    MEDICATIONS  (PRN):  acetaminophen     Tablet .. 650 milliGRAM(s) Oral every 6 hours PRN Temp greater or equal to 38C (100.4F), Mild Pain (1 - 3)  aluminum hydroxide/magnesium hydroxide/simethicone Suspension 30 milliLiter(s) Oral every 4 hours PRN Dyspepsia  benzocaine/menthol Lozenge 1 Lozenge Oral every 4 hours PRN Sore Throat  melatonin 3 milliGRAM(s) Oral at bedtime PRN Insomnia  ondansetron Injectable 4 milliGRAM(s) IV Push every 8 hours PRN Nausea and/or Vomiting  pseudoephedrine 30 milliGRAM(s) Oral daily PRN nasal congestion  sodium chloride 0.65% Nasal 1 Spray(s) Both Nostrils every 6 hours PRN Nasal Congestion      Vital Signs Last 24 Hrs  T(C): 36.6 (07 Dec 2023 04:56), Max: 36.8 (06 Dec 2023 19:53)  T(F): 97.8 (07 Dec 2023 04:56), Max: 98.2 (06 Dec 2023 19:53)  HR: 90 (07 Dec 2023 04:56) (80 - 90)  BP: 104/66 (07 Dec 2023 04:56) (104/66 - 117/75)  BP(mean): --  RR: 17 (07 Dec 2023 04:56) (16 - 17)  SpO2: 92% (07 Dec 2023 04:56) (92% - 94%)    Parameters below as of 07 Dec 2023 04:56  Patient On (Oxygen Delivery Method): room air        PHYSICAL EXAM  General: adult in NAD  HEENT: clear oropharynx, anicteric sclera, pink conjunctivae  Neck: supple  CV: normal S1S2 with no murmur rubs or gallops  Lungs: clear to auscultation, no wheezes, no rhales  Abdomen: soft non-tender non-distended, no hepato/splenomegaly  Ext: no clubbing cyanosis or edema  Skin: no rashes and no petichiae  Neuro: alert and oriented X3 no focal deficits      LABS:  CBC Full  -  ( 07 Dec 2023 09:30 )  WBC Count : 8.07 K/uL  RBC Count : 4.35 M/uL  Hemoglobin : 12.5 g/dL  Hematocrit : 37.3 %  Platelet Count - Automated : Clumped K/uL  Mean Cell Volume : 85.7 fl  Mean Cell Hemoglobin : 28.7 pg  Mean Cell Hemoglobin Concentration : 33.5 gm/dL  Auto Neutrophil # : 2.00 K/uL  Auto Lymphocyte # : 5.38 K/uL  Auto Monocyte # : 0.43 K/uL  Auto Eosinophil # : 0.04 K/uL  Auto Basophil # : 0.10 K/uL  Auto Neutrophil % : 24.8 %  Auto Lymphocyte % : 66.7 %  Auto Monocyte % : 5.3 %  Auto Eosinophil % : 0.5 %  Auto Basophil % : 1.2 %    12-07    139  |  104  |  6<L>  ----------------------------<  103<H>  3.8   |  29  |  0.58    Ca    8.2<L>      07 Dec 2023 09:30    TPro  6.9  /  Alb  2.7<L>  /  TBili  7.3<H>  /  DBili  x   /  AST  388<H>  /  ALT  564<H>  /  AlkPhos  688<H>  12-07        fe studies  Ferritin: 212 ng/mL (12-06 @ 19:20)  Iron - Total Binding Capacity.: 233 ug/dL (12-06 @ 19:20)      WBC trend  8.07 K/uL (12-07-23 @ 09:30)  8.55 K/uL (12-06-23 @ 08:45)  11.18 K/uL (12-05-23 @ 04:31)  12.35 K/uL (12-04-23 @ 22:15)      Hgb trend  12.5 g/dL (12-07-23 @ 09:30)  11.2 g/dL (12-06-23 @ 08:45)  10.7 g/dL (12-05-23 @ 04:31)  12.3 g/dL (12-04-23 @ 22:15)      plt trend  Clumped K/uL (12-07-23 @ 09:30)  87 K/uL (12-06-23 @ 08:45)  90 K/uL (12-05-23 @ 04:31)  77 K/uL (12-04-23 @ 22:15)        RADIOLOGY & ADDITIONAL STUDIES:

## 2023-12-07 NOTE — PROGRESS NOTE ADULT - PROBLEM SELECTOR PLAN 5
with leukocytosis with lymphocytic dominant, indicate picture of  viral infection    will monitor PLT level.  No s/s of bleeding noted   CT abdomen noted for mild Splenomegaly   hematology consult appreciated
with leukocytosis with lymphocytic dominant, indicate picture of  viral infection    will monitor PLT level.
with leukocytosis with lymphocytic dominant, indicate picture of  viral infection    will monitor PLT level.  No s/s of bleeding noted   CT abdomen noted for mild Splenomegaly   hematology consulted as well Dr. Churchill

## 2023-12-07 NOTE — PROGRESS NOTE ADULT - SUBJECTIVE AND OBJECTIVE BOX
[facility]  Shan Stone PA-C  [address] [place], [place] [id number] [phone]      INTERVAL HPI/OVERNIGHT EVENTS:  Pt s/e with mother at bedside  Pt had small BM yesterday  C/o some abdominal discomfort  EBV results d/w pt and mother    MEDICATIONS  (STANDING):  cyanocobalamin 1000 MICROGram(s) Oral daily  fluticasone propionate 50 MICROgram(s)/spray Nasal Spray 2 Spray(s) Both Nostrils two times a day  multivitamin 1 Tablet(s) Oral daily  sodium chloride 0.9%. 1000 milliLiter(s) (80 mL/Hr) IV Continuous <Continuous>  ursodiol Capsule 300 milliGRAM(s) Oral every 12 hours    MEDICATIONS  (PRN):  acetaminophen     Tablet .. 650 milliGRAM(s) Oral every 6 hours PRN Temp greater or equal to 38C (100.4F), Mild Pain (1 - 3)  aluminum hydroxide/magnesium hydroxide/simethicone Suspension 30 milliLiter(s) Oral every 4 hours PRN Dyspepsia  benzocaine/menthol Lozenge 1 Lozenge Oral every 4 hours PRN Sore Throat  melatonin 3 milliGRAM(s) Oral at bedtime PRN Insomnia  ondansetron Injectable 4 milliGRAM(s) IV Push every 8 hours PRN Nausea and/or Vomiting  pseudoephedrine 30 milliGRAM(s) Oral daily PRN nasal congestion  sodium chloride 0.65% Nasal 1 Spray(s) Both Nostrils every 6 hours PRN Nasal Congestion      Allergies    sulfa drugs (Rash)  penicillin (Hives)      PHYSICAL EXAM:   Vital Signs:  Vital Signs Last 24 Hrs  T(C): 37.1 (07 Dec 2023 11:59), Max: 37.1 (07 Dec 2023 11:59)  T(F): 98.8 (07 Dec 2023 11:59), Max: 98.8 (07 Dec 2023 11:59)  HR: 80 (07 Dec 2023 11:59) (80 - 90)  BP: 103/70 (07 Dec 2023 11:59) (103/70 - 117/75)  BP(mean): --  RR: 17 (07 Dec 2023 11:59) (16 - 17)  SpO2: 94% (07 Dec 2023 11:59) (92% - 94%)    Parameters below as of 07 Dec 2023 11:59  Patient On (Oxygen Delivery Method): room air      GENERAL:  Appears stated age  HEENT:  NC/AT  CHEST:  Full & symmetric excursion  HEART:  Regular rhythm  ABDOMEN:  Soft, non-tender, non-distended  EXTEREMITIES:  no cyanosis  SKIN:  No rash  NEURO:  Alert      LABS:                        12.5   8.07  )-----------( Clumped    ( 07 Dec 2023 09:30 )             37.3     12-07    139  |  104  |  6<L>  ----------------------------<  103<H>  3.8   |  29  |  0.58    Ca    8.2<L>      07 Dec 2023 09:30    TPro  6.9  /  Alb  2.7<L>  /  TBili  7.3<H>  /  DBili  x   /  AST  388<H>  /  ALT  564<H>  /  AlkPhos  688<H>  12-07      Urinalysis Basic - ( 07 Dec 2023 09:30 )    Color: x / Appearance: x / SG: x / pH: x  Gluc: 103 mg/dL / Ketone: x  / Bili: x / Urobili: x   Blood: x / Protein: x / Nitrite: x   Leuk Esterase: x / RBC: x / WBC x   Sq Epi: x / Non Sq Epi: x / Bacteria: x   [facility]  Shan Stone PA-C  [address] [place], [place] [id number] [phone]      INTERVAL HPI/OVERNIGHT EVENTS:  Pt s/e with mother at bedside  Pt had small BM yesterday  C/o some abdominal discomfort  EBV results d/w pt and mother    MEDICATIONS  (STANDING):  cyanocobalamin 1000 MICROGram(s) Oral daily  fluticasone propionate 50 MICROgram(s)/spray Nasal Spray 2 Spray(s) Both Nostrils two times a day  multivitamin 1 Tablet(s) Oral daily  sodium chloride 0.9%. 1000 milliLiter(s) (80 mL/Hr) IV Continuous <Continuous>  ursodiol Capsule 300 milliGRAM(s) Oral every 12 hours    MEDICATIONS  (PRN):  acetaminophen     Tablet .. 650 milliGRAM(s) Oral every 6 hours PRN Temp greater or equal to 38C (100.4F), Mild Pain (1 - 3)  aluminum hydroxide/magnesium hydroxide/simethicone Suspension 30 milliLiter(s) Oral every 4 hours PRN Dyspepsia  benzocaine/menthol Lozenge 1 Lozenge Oral every 4 hours PRN Sore Throat  melatonin 3 milliGRAM(s) Oral at bedtime PRN Insomnia  ondansetron Injectable 4 milliGRAM(s) IV Push every 8 hours PRN Nausea and/or Vomiting  pseudoephedrine 30 milliGRAM(s) Oral daily PRN nasal congestion  sodium chloride 0.65% Nasal 1 Spray(s) Both Nostrils every 6 hours PRN Nasal Congestion      Allergies    sulfa drugs (Rash)  penicillin (Hives)      PHYSICAL EXAM:   Vital Signs:  Vital Signs Last 24 Hrs  T(C): 37.1 (07 Dec 2023 11:59), Max: 37.1 (07 Dec 2023 11:59)  T(F): 98.8 (07 Dec 2023 11:59), Max: 98.8 (07 Dec 2023 11:59)  HR: 80 (07 Dec 2023 11:59) (80 - 90)  BP: 103/70 (07 Dec 2023 11:59) (103/70 - 117/75)  BP(mean): --  RR: 17 (07 Dec 2023 11:59) (16 - 17)  SpO2: 94% (07 Dec 2023 11:59) (92% - 94%)    Parameters below as of 07 Dec 2023 11:59  Patient On (Oxygen Delivery Method): room air      GENERAL:  Appears stated age  HEENT:  NC/AT  CHEST:  Full & symmetric excursion  HEART:  Regular rhythm  ABDOMEN:  Soft, non-tender, non-distended  EXTEREMITIES:  no cyanosis  SKIN:  No rash  NEURO:  Alert      LABS:                        12.5   8.07  )-----------( Clumped    ( 07 Dec 2023 09:30 )             37.3     12-07    139  |  104  |  6<L>  ----------------------------<  103<H>  3.8   |  29  |  0.58    Ca    8.2<L>      07 Dec 2023 09:30    TPro  6.9  /  Alb  2.7<L>  /  TBili  7.3<H>  /  DBili  x   /  AST  388<H>  /  ALT  564<H>  /  AlkPhos  688<H>  12-07      Urinalysis Basic - ( 07 Dec 2023 09:30 )    Color: x / Appearance: x / SG: x / pH: x  Gluc: 103 mg/dL / Ketone: x  / Bili: x / Urobili: x   Blood: x / Protein: x / Nitrite: x   Leuk Esterase: x / RBC: x / WBC x   Sq Epi: x / Non Sq Epi: x / Bacteria: x

## 2023-12-07 NOTE — PROGRESS NOTE ADULT - TIME BILLING
direct patient care including but not limited to reviewing chart, medications ,laboratory data, imaging reports, discussion of plan of care with consultants on the case, coordination of care with multidisciplinary team involved in the case and discussion of plan with patient.  Patient and family agreeable to plan of care and verbalized understanding the anticipated hospital course and treatment plan.
Note written by attending. Meds, labs, vitals, chart reviewed. Plan d/w patient and her mother at bedside
Note written by attending. Meds, labs, vitals, chart reviewed. Plan d/w patient and her mother at bedside

## 2023-12-07 NOTE — PROGRESS NOTE ADULT - ASSESSMENT
22 yo female with no PMHx presents to the ED for jaundice and epigastric fullness x1 day. Admitted for elevated transaminases with associated jaundice.     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 Please call with any questions.   Shantell Vargas M.D.  [facility] Division of Infectious Diseases [phone]  For after 5 P.M. and weekends, please call [phone]   24 yo female with no PMHx presents to the ED for jaundice and epigastric fullness x1 day. Admitted for elevated transaminases with associated jaundice.     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 Please call with any questions.   Shantell Vargas M.D.  [facility] Division of Infectious Diseases [phone]  For after 5 P.M. and weekends, please call [phone]

## 2023-12-07 NOTE — PROGRESS NOTE ADULT - SUBJECTIVE AND OBJECTIVE BOX
[facility], Division of Infectious Diseases  SIRISHA Cho Y. Patel, S. Shah, G. [facility]  [phone]    Name: MICHELLE KAUR  Age: 23y  Gender: Female  MRN: [id number]    Interval History:  Patient seen and examined at bedside  No acute overnight events. Afebrile  No complaints  Notes reviewed    Antibiotics:      Medications:  acetaminophen     Tablet .. 650 milliGRAM(s) Oral every 6 hours PRN  aluminum hydroxide/magnesium hydroxide/simethicone Suspension 30 milliLiter(s) Oral every 4 hours PRN  benzocaine/menthol Lozenge 1 Lozenge Oral every 4 hours PRN  cyanocobalamin 1000 MICROGram(s) Oral daily  fluticasone propionate 50 MICROgram(s)/spray Nasal Spray 2 Spray(s) Both Nostrils two times a day  melatonin 3 milliGRAM(s) Oral at bedtime PRN  multivitamin 1 Tablet(s) Oral daily  ondansetron Injectable 4 milliGRAM(s) IV Push every 8 hours PRN  polyethylene glycol 3350 17 Gram(s) Oral daily PRN  pseudoephedrine 30 milliGRAM(s) Oral daily PRN  sodium chloride 0.65% Nasal 1 Spray(s) Both Nostrils every 6 hours PRN  sodium chloride 0.9%. 1000 milliLiter(s) IV Continuous <Continuous>  ursodiol Capsule 300 milliGRAM(s) Oral every 12 hours      Review of Systems:  Review of systems otherwise negative except as previously noted.    Allergies: sulfa drugs (Rash)  penicillin (Hives)    For details regarding the patient's past medical history, social history, family history, and other miscellaneous elements, please refer the initial infectious diseases consultation and/or the admitting history and physical examination for this admission.    Objective:  Vitals:   T(C): 37.1 (12-07-23 @ 11:59), Max: 37.1 (12-07-23 @ 11:59)  HR: 80 (12-07-23 @ 11:59) (80 - 90)  BP: 103/70 (12-07-23 @ 11:59) (103/70 - 117/75)  RR: 17 (12-07-23 @ 11:59) (16 - 17)  SpO2: 94% (12-07-23 @ 11:59) (92% - 94%)    Physical Examination:  General: no acute distress  HEENT: NC/AT, EOMI,  Cardio: RRR  Resp: symmetric chest rise  Abd: appears soft  Ext: no edema or cyanosis  Skin: warm, dry, no visible rash      Laboratory Studies:  CBC:                       12.5   8.07  )-----------( Clumped    ( 07 Dec 2023 09:30 )             37.3     CMP: 12-07    139  |  104  |  6<L>  ----------------------------<  103<H>  3.8   |  29  |  0.58    Ca    8.2<L>      07 Dec 2023 09:30    TPro  6.9  /  Alb  2.7<L>  /  TBili  7.3<H>  /  DBili  x   /  AST  388<H>  /  ALT  564<H>  /  AlkPhos  688<H>  12-07    LIVER FUNCTIONS - ( 07 Dec 2023 09:30 )  Alb: 2.7 g/dL / Pro: 6.9 g/dL / ALK PHOS: 688 U/L / ALT: 564 U/L / AST: 388 U/L / GGT: x           Urinalysis Basic - ( 07 Dec 2023 09:30 )    Color: x / Appearance: x / SG: x / pH: x  Gluc: 103 mg/dL / Ketone: x  / Bili: x / Urobili: x   Blood: x / Protein: x / Nitrite: x   Leuk Esterase: x / RBC: x / WBC x   Sq Epi: x / Non Sq Epi: x / Bacteria: x        Microbiology: reviewed    Culture - Urine (collected 12-05-23 @ 01:00)  Source: Clean Catch Clean Catch (Midstream)  Final Report (12-06-23 @ 14:29):    10,000 - 49,000 CFU/mL Corynebacterium coyleae "Susceptibilities not    performed"          Radiology: reviewed       [facility], Division of Infectious Diseases  SIRISHA Cho Y. Patel, S. Shah, G. [facility]  [phone]    Name: MICHELLE KAUR  Age: 23y  Gender: Female  MRN: [id number]    Interval History:  Patient seen and examined at bedside  No acute overnight events. Afebrile  No complaints  Notes reviewed    Antibiotics:      Medications:  acetaminophen     Tablet .. 650 milliGRAM(s) Oral every 6 hours PRN  aluminum hydroxide/magnesium hydroxide/simethicone Suspension 30 milliLiter(s) Oral every 4 hours PRN  benzocaine/menthol Lozenge 1 Lozenge Oral every 4 hours PRN  cyanocobalamin 1000 MICROGram(s) Oral daily  fluticasone propionate 50 MICROgram(s)/spray Nasal Spray 2 Spray(s) Both Nostrils two times a day  melatonin 3 milliGRAM(s) Oral at bedtime PRN  multivitamin 1 Tablet(s) Oral daily  ondansetron Injectable 4 milliGRAM(s) IV Push every 8 hours PRN  polyethylene glycol 3350 17 Gram(s) Oral daily PRN  pseudoephedrine 30 milliGRAM(s) Oral daily PRN  sodium chloride 0.65% Nasal 1 Spray(s) Both Nostrils every 6 hours PRN  sodium chloride 0.9%. 1000 milliLiter(s) IV Continuous <Continuous>  ursodiol Capsule 300 milliGRAM(s) Oral every 12 hours      Review of Systems:  Review of systems otherwise negative except as previously noted.    Allergies: sulfa drugs (Rash)  penicillin (Hives)    For details regarding the patient's past medical history, social history, family history, and other miscellaneous elements, please refer the initial infectious diseases consultation and/or the admitting history and physical examination for this admission.    Objective:  Vitals:   T(C): 37.1 (12-07-23 @ 11:59), Max: 37.1 (12-07-23 @ 11:59)  HR: 80 (12-07-23 @ 11:59) (80 - 90)  BP: 103/70 (12-07-23 @ 11:59) (103/70 - 117/75)  RR: 17 (12-07-23 @ 11:59) (16 - 17)  SpO2: 94% (12-07-23 @ 11:59) (92% - 94%)    Physical Examination:  General: no acute distress  HEENT: NC/AT, EOMI,  Cardio: RRR  Resp: symmetric chest rise  Abd: appears soft  Ext: no edema or cyanosis  Skin: warm, dry, no visible rash      Laboratory Studies:  CBC:                       12.5   8.07  )-----------( Clumped    ( 07 Dec 2023 09:30 )             37.3     CMP: 12-07    139  |  104  |  6<L>  ----------------------------<  103<H>  3.8   |  29  |  0.58    Ca    8.2<L>      07 Dec 2023 09:30    TPro  6.9  /  Alb  2.7<L>  /  TBili  7.3<H>  /  DBili  x   /  AST  388<H>  /  ALT  564<H>  /  AlkPhos  688<H>  12-07    LIVER FUNCTIONS - ( 07 Dec 2023 09:30 )  Alb: 2.7 g/dL / Pro: 6.9 g/dL / ALK PHOS: 688 U/L / ALT: 564 U/L / AST: 388 U/L / GGT: x           Urinalysis Basic - ( 07 Dec 2023 09:30 )    Color: x / Appearance: x / SG: x / pH: x  Gluc: 103 mg/dL / Ketone: x  / Bili: x / Urobili: x   Blood: x / Protein: x / Nitrite: x   Leuk Esterase: x / RBC: x / WBC x   Sq Epi: x / Non Sq Epi: x / Bacteria: x        Microbiology: reviewed    Culture - Urine (collected 12-05-23 @ 01:00)  Source: Clean Catch Clean Catch (Midstream)  Final Report (12-06-23 @ 14:29):    10,000 - 49,000 CFU/mL Corynebacterium coyleae "Susceptibilities not    performed"          Radiology: reviewed

## 2023-12-08 ENCOUNTER — TRANSCRIPTION ENCOUNTER (OUTPATIENT)
Age: 23
End: 2023-12-08

## 2023-12-08 VITALS
OXYGEN SATURATION: 97 % | TEMPERATURE: 98 F | RESPIRATION RATE: 16 BRPM | DIASTOLIC BLOOD PRESSURE: 67 MMHG | SYSTOLIC BLOOD PRESSURE: 108 MMHG | HEART RATE: 84 BPM

## 2023-12-08 LAB
ALBUMIN SERPL ELPH-MCNC: 2.7 G/DL — LOW (ref 3.3–5)
ALBUMIN SERPL ELPH-MCNC: 2.7 G/DL — LOW (ref 3.3–5)
ALP SERPL-CCNC: 720 U/L — HIGH (ref 40–120)
ALP SERPL-CCNC: 720 U/L — HIGH (ref 40–120)
ALT FLD-CCNC: 504 U/L — HIGH (ref 12–78)
ALT FLD-CCNC: 504 U/L — HIGH (ref 12–78)
ANION GAP SERPL CALC-SCNC: 9 MMOL/L — SIGNIFICANT CHANGE UP (ref 5–17)
ANION GAP SERPL CALC-SCNC: 9 MMOL/L — SIGNIFICANT CHANGE UP (ref 5–17)
AST SERPL-CCNC: 305 U/L — HIGH (ref 15–37)
AST SERPL-CCNC: 305 U/L — HIGH (ref 15–37)
BASOPHILS # BLD AUTO: 0 K/UL — SIGNIFICANT CHANGE UP (ref 0–0.2)
BASOPHILS # BLD AUTO: 0 K/UL — SIGNIFICANT CHANGE UP (ref 0–0.2)
BASOPHILS NFR BLD AUTO: 0 % — SIGNIFICANT CHANGE UP (ref 0–2)
BASOPHILS NFR BLD AUTO: 0 % — SIGNIFICANT CHANGE UP (ref 0–2)
BILIRUB SERPL-MCNC: 7 MG/DL — HIGH (ref 0.2–1.2)
BILIRUB SERPL-MCNC: 7 MG/DL — HIGH (ref 0.2–1.2)
BUN SERPL-MCNC: 6 MG/DL — LOW (ref 7–23)
BUN SERPL-MCNC: 6 MG/DL — LOW (ref 7–23)
CALCIUM SERPL-MCNC: 8.4 MG/DL — LOW (ref 8.5–10.1)
CALCIUM SERPL-MCNC: 8.4 MG/DL — LOW (ref 8.5–10.1)
CHLORIDE SERPL-SCNC: 102 MMOL/L — SIGNIFICANT CHANGE UP (ref 96–108)
CHLORIDE SERPL-SCNC: 102 MMOL/L — SIGNIFICANT CHANGE UP (ref 96–108)
CO2 SERPL-SCNC: 27 MMOL/L — SIGNIFICANT CHANGE UP (ref 22–31)
CO2 SERPL-SCNC: 27 MMOL/L — SIGNIFICANT CHANGE UP (ref 22–31)
CREAT SERPL-MCNC: 0.58 MG/DL — SIGNIFICANT CHANGE UP (ref 0.5–1.3)
CREAT SERPL-MCNC: 0.58 MG/DL — SIGNIFICANT CHANGE UP (ref 0.5–1.3)
EBV DNA SERPL NAA+PROBE-ACNC: 1560 IU/ML — HIGH
EBV DNA SERPL NAA+PROBE-ACNC: 1560 IU/ML — HIGH
EBVPCR LOG: 3.19 LOG10IU/ML — HIGH
EBVPCR LOG: 3.19 LOG10IU/ML — HIGH
EGFR: 131 ML/MIN/1.73M2 — SIGNIFICANT CHANGE UP
EGFR: 131 ML/MIN/1.73M2 — SIGNIFICANT CHANGE UP
EOSINOPHIL # BLD AUTO: 0 K/UL — SIGNIFICANT CHANGE UP (ref 0–0.5)
EOSINOPHIL # BLD AUTO: 0 K/UL — SIGNIFICANT CHANGE UP (ref 0–0.5)
EOSINOPHIL NFR BLD AUTO: 0 % — SIGNIFICANT CHANGE UP (ref 0–6)
EOSINOPHIL NFR BLD AUTO: 0 % — SIGNIFICANT CHANGE UP (ref 0–6)
GLUCOSE SERPL-MCNC: 86 MG/DL — SIGNIFICANT CHANGE UP (ref 70–99)
GLUCOSE SERPL-MCNC: 86 MG/DL — SIGNIFICANT CHANGE UP (ref 70–99)
HCT VFR BLD CALC: 37.9 % — SIGNIFICANT CHANGE UP (ref 34.5–45)
HCT VFR BLD CALC: 37.9 % — SIGNIFICANT CHANGE UP (ref 34.5–45)
HGB BLD-MCNC: 12.5 G/DL — SIGNIFICANT CHANGE UP (ref 11.5–15.5)
HGB BLD-MCNC: 12.5 G/DL — SIGNIFICANT CHANGE UP (ref 11.5–15.5)
LYMPHOCYTES # BLD AUTO: 5.73 K/UL — HIGH (ref 1–3.3)
LYMPHOCYTES # BLD AUTO: 5.73 K/UL — HIGH (ref 1–3.3)
LYMPHOCYTES # BLD AUTO: 54 % — HIGH (ref 13–44)
LYMPHOCYTES # BLD AUTO: 54 % — HIGH (ref 13–44)
MAGNESIUM SERPL-MCNC: 2.2 MG/DL — SIGNIFICANT CHANGE UP (ref 1.6–2.6)
MAGNESIUM SERPL-MCNC: 2.2 MG/DL — SIGNIFICANT CHANGE UP (ref 1.6–2.6)
MCHC RBC-ENTMCNC: 28.3 PG — SIGNIFICANT CHANGE UP (ref 27–34)
MCHC RBC-ENTMCNC: 28.3 PG — SIGNIFICANT CHANGE UP (ref 27–34)
MCHC RBC-ENTMCNC: 33 GM/DL — SIGNIFICANT CHANGE UP (ref 32–36)
MCHC RBC-ENTMCNC: 33 GM/DL — SIGNIFICANT CHANGE UP (ref 32–36)
MCV RBC AUTO: 85.9 FL — SIGNIFICANT CHANGE UP (ref 80–100)
MCV RBC AUTO: 85.9 FL — SIGNIFICANT CHANGE UP (ref 80–100)
MONOCYTES # BLD AUTO: 0.74 K/UL — SIGNIFICANT CHANGE UP (ref 0–0.9)
MONOCYTES # BLD AUTO: 0.74 K/UL — SIGNIFICANT CHANGE UP (ref 0–0.9)
MONOCYTES NFR BLD AUTO: 7 % — SIGNIFICANT CHANGE UP (ref 2–14)
MONOCYTES NFR BLD AUTO: 7 % — SIGNIFICANT CHANGE UP (ref 2–14)
NEUTROPHILS # BLD AUTO: 3.4 K/UL — SIGNIFICANT CHANGE UP (ref 1.8–7.4)
NEUTROPHILS # BLD AUTO: 3.4 K/UL — SIGNIFICANT CHANGE UP (ref 1.8–7.4)
NEUTROPHILS NFR BLD AUTO: 30 % — LOW (ref 43–77)
NEUTROPHILS NFR BLD AUTO: 30 % — LOW (ref 43–77)
NRBC # BLD: SIGNIFICANT CHANGE UP /100 WBCS (ref 0–0)
NRBC # BLD: SIGNIFICANT CHANGE UP /100 WBCS (ref 0–0)
PHOSPHATE SERPL-MCNC: 3.6 MG/DL — SIGNIFICANT CHANGE UP (ref 2.5–4.5)
PHOSPHATE SERPL-MCNC: 3.6 MG/DL — SIGNIFICANT CHANGE UP (ref 2.5–4.5)
PLATELET # BLD AUTO: SIGNIFICANT CHANGE UP K/UL (ref 150–400)
PLATELET # BLD AUTO: SIGNIFICANT CHANGE UP K/UL (ref 150–400)
POTASSIUM SERPL-MCNC: 4.4 MMOL/L — SIGNIFICANT CHANGE UP (ref 3.5–5.3)
POTASSIUM SERPL-MCNC: 4.4 MMOL/L — SIGNIFICANT CHANGE UP (ref 3.5–5.3)
POTASSIUM SERPL-SCNC: 4.4 MMOL/L — SIGNIFICANT CHANGE UP (ref 3.5–5.3)
POTASSIUM SERPL-SCNC: 4.4 MMOL/L — SIGNIFICANT CHANGE UP (ref 3.5–5.3)
PROT SERPL-MCNC: 7.4 G/DL — SIGNIFICANT CHANGE UP (ref 6–8.3)
PROT SERPL-MCNC: 7.4 G/DL — SIGNIFICANT CHANGE UP (ref 6–8.3)
RBC # BLD: 4.41 M/UL — SIGNIFICANT CHANGE UP (ref 3.8–5.2)
RBC # BLD: 4.41 M/UL — SIGNIFICANT CHANGE UP (ref 3.8–5.2)
RBC # FLD: 13.4 % — SIGNIFICANT CHANGE UP (ref 10.3–14.5)
RBC # FLD: 13.4 % — SIGNIFICANT CHANGE UP (ref 10.3–14.5)
SODIUM SERPL-SCNC: 138 MMOL/L — SIGNIFICANT CHANGE UP (ref 135–145)
SODIUM SERPL-SCNC: 138 MMOL/L — SIGNIFICANT CHANGE UP (ref 135–145)
WBC # BLD: 10.61 K/UL — HIGH (ref 3.8–10.5)
WBC # BLD: 10.61 K/UL — HIGH (ref 3.8–10.5)
WBC # FLD AUTO: 10.61 K/UL — HIGH (ref 3.8–10.5)
WBC # FLD AUTO: 10.61 K/UL — HIGH (ref 3.8–10.5)

## 2023-12-08 PROCEDURE — 86664 EPSTEIN-BARR NUCLEAR ANTIGEN: CPT

## 2023-12-08 PROCEDURE — 85652 RBC SED RATE AUTOMATED: CPT

## 2023-12-08 PROCEDURE — 81001 URINALYSIS AUTO W/SCOPE: CPT

## 2023-12-08 PROCEDURE — 87484 EHRLICHA CHAFFEENSIS AMP PRB: CPT

## 2023-12-08 PROCEDURE — 96375 TX/PRO/DX INJ NEW DRUG ADDON: CPT

## 2023-12-08 PROCEDURE — 86381 MITOCHONDRIAL ANTIBODY EACH: CPT

## 2023-12-08 PROCEDURE — 83735 ASSAY OF MAGNESIUM: CPT

## 2023-12-08 PROCEDURE — 83615 LACTATE (LD) (LDH) ENZYME: CPT

## 2023-12-08 PROCEDURE — 99285 EMERGENCY DEPT VISIT HI MDM: CPT | Mod: 25

## 2023-12-08 PROCEDURE — 74177 CT ABD & PELVIS W/CONTRAST: CPT | Mod: MA

## 2023-12-08 PROCEDURE — 87468 ANAPLSMA PHGCYTOPHLM AMP PRB: CPT

## 2023-12-08 PROCEDURE — 86900 BLOOD TYPING SEROLOGIC ABO: CPT

## 2023-12-08 PROCEDURE — 83010 ASSAY OF HAPTOGLOBIN QUANT: CPT

## 2023-12-08 PROCEDURE — 0225U NFCT DS DNA&RNA 21 SARSCOV2: CPT

## 2023-12-08 PROCEDURE — 84100 ASSAY OF PHOSPHORUS: CPT

## 2023-12-08 PROCEDURE — 87077 CULTURE AEROBIC IDENTIFY: CPT

## 2023-12-08 PROCEDURE — 82607 VITAMIN B-12: CPT

## 2023-12-08 PROCEDURE — 84484 ASSAY OF TROPONIN QUANT: CPT

## 2023-12-08 PROCEDURE — 85025 COMPLETE CBC W/AUTO DIFF WBC: CPT

## 2023-12-08 PROCEDURE — 83690 ASSAY OF LIPASE: CPT

## 2023-12-08 PROCEDURE — 82247 BILIRUBIN TOTAL: CPT

## 2023-12-08 PROCEDURE — 86140 C-REACTIVE PROTEIN: CPT

## 2023-12-08 PROCEDURE — 83550 IRON BINDING TEST: CPT

## 2023-12-08 PROCEDURE — 82248 BILIRUBIN DIRECT: CPT

## 2023-12-08 PROCEDURE — 83540 ASSAY OF IRON: CPT

## 2023-12-08 PROCEDURE — 86880 COOMBS TEST DIRECT: CPT

## 2023-12-08 PROCEDURE — 87798 DETECT AGENT NOS DNA AMP: CPT

## 2023-12-08 PROCEDURE — 86038 ANTINUCLEAR ANTIBODIES: CPT

## 2023-12-08 PROCEDURE — 96374 THER/PROPH/DIAG INJ IV PUSH: CPT

## 2023-12-08 PROCEDURE — 76705 ECHO EXAM OF ABDOMEN: CPT

## 2023-12-08 PROCEDURE — 80074 ACUTE HEPATITIS PANEL: CPT

## 2023-12-08 PROCEDURE — 86663 EPSTEIN-BARR ANTIBODY: CPT

## 2023-12-08 PROCEDURE — 87799 DETECT AGENT NOS DNA QUANT: CPT

## 2023-12-08 PROCEDURE — 36415 COLL VENOUS BLD VENIPUNCTURE: CPT

## 2023-12-08 PROCEDURE — 84702 CHORIONIC GONADOTROPIN TEST: CPT

## 2023-12-08 PROCEDURE — 80053 COMPREHEN METABOLIC PANEL: CPT

## 2023-12-08 PROCEDURE — 87389 HIV-1 AG W/HIV-1&-2 AB AG IA: CPT

## 2023-12-08 PROCEDURE — 86618 LYME DISEASE ANTIBODY: CPT

## 2023-12-08 PROCEDURE — 86665 EPSTEIN-BARR CAPSID VCA: CPT

## 2023-12-08 PROCEDURE — 86644 CMV ANTIBODY: CPT

## 2023-12-08 PROCEDURE — 82746 ASSAY OF FOLIC ACID SERUM: CPT

## 2023-12-08 PROCEDURE — 85045 AUTOMATED RETICULOCYTE COUNT: CPT

## 2023-12-08 PROCEDURE — 86645 CMV ANTIBODY IGM: CPT

## 2023-12-08 PROCEDURE — 82728 ASSAY OF FERRITIN: CPT

## 2023-12-08 PROCEDURE — 87086 URINE CULTURE/COLONY COUNT: CPT

## 2023-12-08 PROCEDURE — 86901 BLOOD TYPING SEROLOGIC RH(D): CPT

## 2023-12-08 PROCEDURE — 86850 RBC ANTIBODY SCREEN: CPT

## 2023-12-08 PROCEDURE — 94640 AIRWAY INHALATION TREATMENT: CPT

## 2023-12-08 PROCEDURE — 83605 ASSAY OF LACTIC ACID: CPT

## 2023-12-08 PROCEDURE — 86376 MICROSOMAL ANTIBODY EACH: CPT

## 2023-12-08 PROCEDURE — 99239 HOSP IP/OBS DSCHRG MGMT >30: CPT

## 2023-12-08 PROCEDURE — 87637 SARSCOV2&INF A&B&RSV AMP PRB: CPT

## 2023-12-08 RX ORDER — URSODIOL 250 MG/1
1 TABLET, FILM COATED ORAL
Qty: 28 | Refills: 0
Start: 2023-12-08 | End: 2023-12-21

## 2023-12-08 RX ADMIN — URSODIOL 300 MILLIGRAM(S): 250 TABLET, FILM COATED ORAL at 05:12

## 2023-12-08 RX ADMIN — BENZOCAINE AND MENTHOL 1 LOZENGE: 5; 1 LIQUID ORAL at 15:23

## 2023-12-08 RX ADMIN — Medication 1 TABLET(S): at 10:28

## 2023-12-08 RX ADMIN — Medication 30 MILLIGRAM(S): at 06:12

## 2023-12-08 RX ADMIN — BENZOCAINE AND MENTHOL 1 LOZENGE: 5; 1 LIQUID ORAL at 02:56

## 2023-12-08 RX ADMIN — PREGABALIN 1000 MICROGRAM(S): 225 CAPSULE ORAL at 10:28

## 2023-12-08 RX ADMIN — BENZOCAINE AND MENTHOL 1 LOZENGE: 5; 1 LIQUID ORAL at 10:28

## 2023-12-08 RX ADMIN — Medication 1 SPRAY(S): at 05:12

## 2023-12-08 RX ADMIN — Medication 2 SPRAY(S): at 05:00

## 2023-12-08 NOTE — PROGRESS NOTE ADULT - ASSESSMENT
a/p inc lfts  +parainfluenza  +EBV    EBV noted, suspect elevated LFT 2/2 mono  ID following  ursodiol 300bid  supportive care   inc po intake  gerd precautions  d/c planning with outpatient follow up to check LFTs in 1 week  d/w patient and family

## 2023-12-08 NOTE — PROGRESS NOTE ADULT - PROVIDER SPECIALTY LIST ADULT
Infectious Disease
Heme/Onc
Gastroenterology
Infectious Disease
Infectious Disease
Gastroenterology
Gastroenterology
Hospitalist

## 2023-12-08 NOTE — DISCHARGE NOTE NURSING/CASE MANAGEMENT/SOCIAL WORK - NSDCPEFALRISK_GEN_ALL_CORE
For information on Fall & Injury Prevention, visit: [email or web address] OR  [email or web address] OR  https://www.cdc.gov/steadi/patient.html For information on Fall & Injury Prevention, visit: [email or web address] OR  [email or web address] OR  https://www.cdc.gov/steadi/patient.html

## 2023-12-08 NOTE — PROGRESS NOTE ADULT - REASON FOR ADMISSION
Jaundice/Elevated LFTs

## 2023-12-08 NOTE — PROGRESS NOTE ADULT - ASSESSMENT
24 yo female with no PMHx presents to the ED for jaundice and epigastric fullness x1 day. Admitted for elevated transaminases with associated jaundice.     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 Parra    Infectious Diseases will continue to follow. Please call with any questions.   Shantell Vargas M.D.  [facility] [phone]  For after 5 P.M. and weekends, please call [phone]   22 yo female with no PMHx presents to the ED for jaundice and epigastric fullness x1 day. Admitted for elevated transaminases with associated jaundice.     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 Parra    Infectious Diseases will continue to follow. Please call with any questions.   Shantell Vargas M.D.  [facility] [phone]  For after 5 P.M. and weekends, please call [phone]   22 yo female with no PMHx presents to the ED for jaundice and epigastric fullness x1 day. Admitted for elevated transaminases with associated jaundice.     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 Parra    Please call with any questions.   Shantell Vargas M.D.  [facility] [phone]  For after 5 P.M. and weekends, please call [phone]   24 yo female with no PMHx presents to the ED for jaundice and epigastric fullness x1 day. Admitted for elevated transaminases with associated jaundice.     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 Parra    Please call with any questions.   Shantell Vargas M.D.  [facility] [phone]  For after 5 P.M. and weekends, please call [phone]

## 2023-12-08 NOTE — CASE MANAGEMENT PROGRESS NOTE - NSCMPROGRESSNOTE_GEN_ALL_CORE
Discussed on rounds this am.  Pt anticipated for transition to home pending labs and ID / GI clearance.  No skilled needs anticipated.  A CM will remain available through hospitalization as needed.

## 2023-12-08 NOTE — DISCHARGE NOTE NURSING/CASE MANAGEMENT/SOCIAL WORK - PATIENT PORTAL LINK FT
You can access the FollowMyHealth Patient Portal offered by [facility] by registering at the following website: [email or web address]. By joining [employer]’s FollowMyHealth portal, you will also be able to view your health information using other applications (apps) compatible with our system. You can access the FollowMyHealth Patient Portal offered by [facility] by registering at the following website: [email or web address]. By joining [employer]’s FollowMyHealth portal, you will also be able to view your health information using other applications (apps) compatible with our system.

## 2023-12-08 NOTE — PROGRESS NOTE ADULT - SUBJECTIVE AND OBJECTIVE BOX
[facility], Division of Infectious Diseases  SIRISHA Cho Y. Patel, S. Shah, G. [facility]  [phone]    Name: MICHELLE KAUR  Age: 23y  Gender: Female  MRN: [id number]    Interval History:  Patient seen and examined at bedside this morning  No acute overnight events. Afebrile  Feeling congested today  Notes reviewed    Antibiotics:      Medications:  aluminum hydroxide/magnesium hydroxide/simethicone Suspension 30 milliLiter(s) Oral every 4 hours PRN  benzocaine/menthol Lozenge 1 Lozenge Oral every 4 hours PRN  cyanocobalamin 1000 MICROGram(s) Oral daily  fluticasone propionate 50 MICROgram(s)/spray Nasal Spray 2 Spray(s) Both Nostrils two times a day  melatonin 3 milliGRAM(s) Oral at bedtime PRN  multivitamin 1 Tablet(s) Oral daily  ondansetron Injectable 4 milliGRAM(s) IV Push every 8 hours PRN  polyethylene glycol 3350 17 Gram(s) Oral daily PRN  pseudoephedrine 30 milliGRAM(s) Oral daily PRN  sodium chloride 0.65% Nasal 1 Spray(s) Both Nostrils every 6 hours PRN  sodium chloride 0.9%. 1000 milliLiter(s) IV Continuous <Continuous>  ursodiol Capsule 300 milliGRAM(s) Oral every 12 hours      Review of Systems:  A 10-point review of systems was obtained.     Pertinent positives and negatives--  Constitutional: No fevers. No Chills. No Rigors.   Cardiovascular: No chest pain. No palpitations.  Respiratory: No shortness of breath. No cough.  Gastrointestinal: No nausea or vomiting. No diarrhea or constipation.   Psychiatric: Pleasant. Appropriate affect.    Review of systems otherwise negative except as previously noted.    Allergies: sulfa drugs (Rash)  penicillin (Hives)    For details regarding the patient's past medical history, social history, family history, and other miscellaneous elements, please refer the initial infectious diseases consultation and/or the admitting history and physical examination for this admission.    Objective:  Vitals:   T(C): 36.8 (12-08-23 @ 12:05), Max: 36.9 (12-07-23 @ 21:37)  HR: 89 (12-08-23 @ 12:05) (82 - 89)  BP: 104/71 (12-08-23 @ 12:05) (104/71 - 107/69)  RR: 16 (12-08-23 @ 12:05) (16 - 17)  SpO2: 96% (12-08-23 @ 12:05) (93% - 96%)    Physical Examination:  General: no acute distress  HEENT: NC/AT, EOMI,   Cardio: S1, S2 heard, RRR, no murmurs  Resp: breath sounds heard bilaterally, no rales, wheezes or rhonchi  Abd: soft, NT, ND,   Ext: no edema or cyanosis  Skin: warm, dry, no visible rash      Laboratory Studies:  CBC:                       12.5   10.61 )-----------( Clumped    ( 08 Dec 2023 08:48 )             37.9     CMP: 12-08    138  |  102  |  6<L>  ----------------------------<  86  4.4   |  27  |  0.58    Ca    8.4<L>      08 Dec 2023 08:48  Phos  3.6     12-08  Mg     2.2     12-08    TPro  7.4  /  Alb  2.7<L>  /  TBili  7.0<H>  /  DBili  x   /  AST  305<H>  /  ALT  504<H>  /  AlkPhos  720<H>  12-08    LIVER FUNCTIONS - ( 08 Dec 2023 08:48 )  Alb: 2.7 g/dL / Pro: 7.4 g/dL / ALK PHOS: 720 U/L / ALT: 504 U/L / AST: 305 U/L / GGT: x           Urinalysis Basic - ( 08 Dec 2023 08:48 )    Color: x / Appearance: x / SG: x / pH: x  Gluc: 86 mg/dL / Ketone: x  / Bili: x / Urobili: x   Blood: x / Protein: x / Nitrite: x   Leuk Esterase: x / RBC: x / WBC x   Sq Epi: x / Non Sq Epi: x / Bacteria: x        Microbiology: reviewed    Culture - Urine (collected 12-05-23 @ 01:00)  Source: Clean Catch Clean Catch (Midstream)  Final Report (12-06-23 @ 14:29):    10,000 - 49,000 CFU/mL Corynebacterium coyleae "Susceptibilities not    performed"          Radiology: reviewed       [facility], Division of Infectious Diseases  SIRISHA Cho Y. Patel, S. Shah, G. [facility]  [phone]    Name: MICHELLE KAUR  Age: 23y  Gender: Female  MRN: [id number]    Interval History:  Patient seen and examined at bedside this morning  No acute overnight events. Afebrile  Feeling congested today  Notes reviewed    Antibiotics:      Medications:  aluminum hydroxide/magnesium hydroxide/simethicone Suspension 30 milliLiter(s) Oral every 4 hours PRN  benzocaine/menthol Lozenge 1 Lozenge Oral every 4 hours PRN  cyanocobalamin 1000 MICROGram(s) Oral daily  fluticasone propionate 50 MICROgram(s)/spray Nasal Spray 2 Spray(s) Both Nostrils two times a day  melatonin 3 milliGRAM(s) Oral at bedtime PRN  multivitamin 1 Tablet(s) Oral daily  ondansetron Injectable 4 milliGRAM(s) IV Push every 8 hours PRN  polyethylene glycol 3350 17 Gram(s) Oral daily PRN  pseudoephedrine 30 milliGRAM(s) Oral daily PRN  sodium chloride 0.65% Nasal 1 Spray(s) Both Nostrils every 6 hours PRN  sodium chloride 0.9%. 1000 milliLiter(s) IV Continuous <Continuous>  ursodiol Capsule 300 milliGRAM(s) Oral every 12 hours      Review of Systems:  A 10-point review of systems was obtained.     Pertinent positives and negatives--  Constitutional: No fevers. No Chills. No Rigors.   Cardiovascular: No chest pain. No palpitations.  Respiratory: No shortness of breath. No cough.  Gastrointestinal: No nausea or vomiting. No diarrhea or constipation.   Psychiatric: Pleasant. Appropriate affect.    Review of systems otherwise negative except as previously noted.    Allergies: sulfa drugs (Rash)  penicillin (Hives)    For details regarding the patient's past medical history, social history, family history, and other miscellaneous elements, please refer the initial infectious diseases consultation and/or the admitting history and physical examination for this admission.    Objective:  Vitals:   T(C): 36.8 (12-08-23 @ 12:05), Max: 36.9 (12-07-23 @ 21:37)  HR: 89 (12-08-23 @ 12:05) (82 - 89)  BP: 104/71 (12-08-23 @ 12:05) (104/71 - 107/69)  RR: 16 (12-08-23 @ 12:05) (16 - 17)  SpO2: 96% (12-08-23 @ 12:05) (93% - 96%)    Physical Examination:  General: no acute distress  HEENT: NC/AT, EOMI,   Cardio: S1, S2 heard, RRR, no murmurs  Resp: breath sounds heard bilaterally, no rales, wheezes or rhonchi  Abd: soft, NT, ND,   Ext: no edema or cyanosis  Skin: warm, dry, no visible rash      Laboratory Studies:  CBC:                       12.5   10.61 )-----------( Clumped    ( 08 Dec 2023 08:48 )             37.9     CMP: 12-08    138  |  102  |  6<L>  ----------------------------<  86  4.4   |  27  |  0.58    Ca    8.4<L>      08 Dec 2023 08:48  Phos  3.6     12-08  Mg     2.2     12-08    TPro  7.4  /  Alb  2.7<L>  /  TBili  7.0<H>  /  DBili  x   /  AST  305<H>  /  ALT  504<H>  /  AlkPhos  720<H>  12-08    LIVER FUNCTIONS - ( 08 Dec 2023 08:48 )  Alb: 2.7 g/dL / Pro: 7.4 g/dL / ALK PHOS: 720 U/L / ALT: 504 U/L / AST: 305 U/L / GGT: x           Urinalysis Basic - ( 08 Dec 2023 08:48 )    Color: x / Appearance: x / SG: x / pH: x  Gluc: 86 mg/dL / Ketone: x  / Bili: x / Urobili: x   Blood: x / Protein: x / Nitrite: x   Leuk Esterase: x / RBC: x / WBC x   Sq Epi: x / Non Sq Epi: x / Bacteria: x        Microbiology: reviewed    Culture - Urine (collected 12-05-23 @ 01:00)  Source: Clean Catch Clean Catch (Midstream)  Final Report (12-06-23 @ 14:29):    10,000 - 49,000 CFU/mL Corynebacterium coyleae "Susceptibilities not    performed"          Radiology: reviewed

## 2023-12-08 NOTE — PROGRESS NOTE ADULT - SUBJECTIVE AND OBJECTIVE BOX
[facility]  Shan Stone PA-C  [address] [place] [address]  [phone]      INTERVAL HPI/OVERNIGHT EVENTS:  Pt s/e with father at bedside  Abdominal discomfort from overnight resolved  Pt c/o congestion but no GI complaints    MEDICATIONS  (STANDING):  cyanocobalamin 1000 MICROGram(s) Oral daily  fluticasone propionate 50 MICROgram(s)/spray Nasal Spray 2 Spray(s) Both Nostrils two times a day  multivitamin 1 Tablet(s) Oral daily  sodium chloride 0.9%. 1000 milliLiter(s) (80 mL/Hr) IV Continuous <Continuous>  ursodiol Capsule 300 milliGRAM(s) Oral every 12 hours    MEDICATIONS  (PRN):  aluminum hydroxide/magnesium hydroxide/simethicone Suspension 30 milliLiter(s) Oral every 4 hours PRN Dyspepsia  benzocaine/menthol Lozenge 1 Lozenge Oral every 4 hours PRN Sore Throat  melatonin 3 milliGRAM(s) Oral at bedtime PRN Insomnia  ondansetron Injectable 4 milliGRAM(s) IV Push every 8 hours PRN Nausea and/or Vomiting  polyethylene glycol 3350 17 Gram(s) Oral daily PRN Constipation  pseudoephedrine 30 milliGRAM(s) Oral daily PRN nasal congestion  sodium chloride 0.65% Nasal 1 Spray(s) Both Nostrils every 6 hours PRN Nasal Congestion      Allergies    sulfa drugs (Rash)  penicillin (Hives)        PHYSICAL EXAM:   Vital Signs:  Vital Signs Last 24 Hrs  T(C): 36.7 (08 Dec 2023 05:12), Max: 37.1 (07 Dec 2023 11:59)  T(F): 98.1 (08 Dec 2023 05:12), Max: 98.8 (07 Dec 2023 11:59)  HR: 87 (08 Dec 2023 05:12) (80 - 87)  BP: 105/66 (08 Dec 2023 05:12) (103/70 - 107/69)  BP(mean): --  RR: 17 (08 Dec 2023 05:12) (17 - 17)  SpO2: 93% (08 Dec 2023 05:12) (93% - 94%)    Parameters below as of 08 Dec 2023 05:12  Patient On (Oxygen Delivery Method): room air    GENERAL:  Appears stated age  HEENT:  +congested  CHEST:  Full & symmetric excursion  HEART:  Regular rhythm  ABDOMEN:  Soft, non-tender, non-distended  EXTEREMITIES:  no cyanosis  SKIN:  No rash  NEURO:  Alert      LABS:                        12.5   10.61 )-----------( Clumped    ( 08 Dec 2023 08:48 )             37.9     12-08    138  |  102  |  6<L>  ----------------------------<  86  4.4   |  27  |  0.58    Ca    8.4<L>      08 Dec 2023 08:48  Phos  3.6     12-08  Mg     2.2     12-08    TPro  7.4  /  Alb  2.7<L>  /  TBili  7.0<H>  /  DBili  x   /  AST  305<H>  /  ALT  504<H>  /  AlkPhos  720<H>  12-08      Urinalysis Basic - ( 08 Dec 2023 08:48 )    Color: x / Appearance: x / SG: x / pH: x  Gluc: 86 mg/dL / Ketone: x  / Bili: x / Urobili: x   Blood: x / Protein: x / Nitrite: x   Leuk Esterase: x / RBC: x / WBC x   Sq Epi: x / Non Sq Epi: x / Bacteria: x   [facility]  Shan Stone PA-C  [address] [place] [address]  [phone]      INTERVAL HPI/OVERNIGHT EVENTS:  Pt s/e with father at bedside  Abdominal discomfort from overnight resolved  Pt c/o congestion but no GI complaints    MEDICATIONS  (STANDING):  cyanocobalamin 1000 MICROGram(s) Oral daily  fluticasone propionate 50 MICROgram(s)/spray Nasal Spray 2 Spray(s) Both Nostrils two times a day  multivitamin 1 Tablet(s) Oral daily  sodium chloride 0.9%. 1000 milliLiter(s) (80 mL/Hr) IV Continuous <Continuous>  ursodiol Capsule 300 milliGRAM(s) Oral every 12 hours    MEDICATIONS  (PRN):  aluminum hydroxide/magnesium hydroxide/simethicone Suspension 30 milliLiter(s) Oral every 4 hours PRN Dyspepsia  benzocaine/menthol Lozenge 1 Lozenge Oral every 4 hours PRN Sore Throat  melatonin 3 milliGRAM(s) Oral at bedtime PRN Insomnia  ondansetron Injectable 4 milliGRAM(s) IV Push every 8 hours PRN Nausea and/or Vomiting  polyethylene glycol 3350 17 Gram(s) Oral daily PRN Constipation  pseudoephedrine 30 milliGRAM(s) Oral daily PRN nasal congestion  sodium chloride 0.65% Nasal 1 Spray(s) Both Nostrils every 6 hours PRN Nasal Congestion      Allergies    sulfa drugs (Rash)  penicillin (Hives)        PHYSICAL EXAM:   Vital Signs:  Vital Signs Last 24 Hrs  T(C): 36.7 (08 Dec 2023 05:12), Max: 37.1 (07 Dec 2023 11:59)  T(F): 98.1 (08 Dec 2023 05:12), Max: 98.8 (07 Dec 2023 11:59)  HR: 87 (08 Dec 2023 05:12) (80 - 87)  BP: 105/66 (08 Dec 2023 05:12) (103/70 - 107/69)  BP(mean): --  RR: 17 (08 Dec 2023 05:12) (17 - 17)  SpO2: 93% (08 Dec 2023 05:12) (93% - 94%)    Parameters below as of 08 Dec 2023 05:12  Patient On (Oxygen Delivery Method): room air    GENERAL:  Appears stated age  HEENT:  +congested  CHEST:  Full & symmetric excursion  HEART:  Regular rhythm  ABDOMEN:  Soft, non-tender, non-distended  EXTEREMITIES:  no cyanosis  SKIN:  No rash  NEURO:  Alert      LABS:                        12.5   10.61 )-----------( Clumped    ( 08 Dec 2023 08:48 )             37.9     12-08    138  |  102  |  6<L>  ----------------------------<  86  4.4   |  27  |  0.58    Ca    8.4<L>      08 Dec 2023 08:48  Phos  3.6     12-08  Mg     2.2     12-08    TPro  7.4  /  Alb  2.7<L>  /  TBili  7.0<H>  /  DBili  x   /  AST  305<H>  /  ALT  504<H>  /  AlkPhos  720<H>  12-08      Urinalysis Basic - ( 08 Dec 2023 08:48 )    Color: x / Appearance: x / SG: x / pH: x  Gluc: 86 mg/dL / Ketone: x  / Bili: x / Urobili: x   Blood: x / Protein: x / Nitrite: x   Leuk Esterase: x / RBC: x / WBC x   Sq Epi: x / Non Sq Epi: x / Bacteria: x

## 2023-12-10 ENCOUNTER — TRANSCRIPTION ENCOUNTER (OUTPATIENT)
Age: 23
End: 2023-12-10

## 2023-12-11 ENCOUNTER — TRANSCRIPTION ENCOUNTER (OUTPATIENT)
Age: 23
End: 2023-12-11

## 2024-01-16 PROBLEM — Z78.9 OTHER SPECIFIED HEALTH STATUS: Chronic | Status: ACTIVE | Noted: 2023-12-05

## 2024-01-17 ENCOUNTER — APPOINTMENT (OUTPATIENT)
Dept: ULTRASOUND IMAGING | Facility: CLINIC | Age: 24
End: 2024-01-17
Payer: COMMERCIAL

## 2024-01-17 PROCEDURE — 76700 US EXAM ABDOM COMPLETE: CPT

## 2024-12-23 ENCOUNTER — APPOINTMENT (OUTPATIENT)
Dept: INTERNAL MEDICINE | Facility: CLINIC | Age: 24
End: 2024-12-23

## 2024-12-23 ENCOUNTER — NON-APPOINTMENT (OUTPATIENT)
Age: 24
End: 2024-12-23

## 2024-12-23 VITALS
HEIGHT: 69 IN | SYSTOLIC BLOOD PRESSURE: 128 MMHG | BODY MASS INDEX: 24.44 KG/M2 | OXYGEN SATURATION: 99 % | RESPIRATION RATE: 17 BRPM | WEIGHT: 165 LBS | HEART RATE: 93 BPM | TEMPERATURE: 98.2 F | DIASTOLIC BLOOD PRESSURE: 83 MMHG

## 2024-12-23 DIAGNOSIS — Z78.9 OTHER SPECIFIED HEALTH STATUS: ICD-10-CM

## 2024-12-23 DIAGNOSIS — Z83.79 FAMILY HISTORY OF OTHER DISEASES OF THE DIGESTIVE SYSTEM: ICD-10-CM

## 2024-12-23 DIAGNOSIS — R22.2 LOCALIZED SWELLING, MASS AND LUMP, TRUNK: ICD-10-CM

## 2024-12-23 DIAGNOSIS — Z82.49 FAMILY HISTORY OF ISCHEMIC HEART DISEASE AND OTHER DISEASES OF THE CIRCULATORY SYSTEM: ICD-10-CM

## 2024-12-23 DIAGNOSIS — Z80.0 FAMILY HISTORY OF MALIGNANT NEOPLASM OF DIGESTIVE ORGANS: ICD-10-CM

## 2024-12-23 DIAGNOSIS — Z01.419 ENCOUNTER FOR GYNECOLOGICAL EXAMINATION (GENERAL) (ROUTINE) W/OUT ABNORMAL FINDINGS: ICD-10-CM

## 2024-12-23 DIAGNOSIS — Z86.19 PERSONAL HISTORY OF OTHER INFECTIOUS AND PARASITIC DISEASES: ICD-10-CM

## 2024-12-23 DIAGNOSIS — R53.83 OTHER FATIGUE: ICD-10-CM

## 2024-12-23 DIAGNOSIS — Z00.00 ENCOUNTER FOR GENERAL ADULT MEDICAL EXAMINATION W/OUT ABNORMAL FINDINGS: ICD-10-CM

## 2024-12-23 LAB
APPEARANCE: CLEAR
BACTERIA: ABNORMAL /HPF
BILIRUBIN URINE: NEGATIVE
BLOOD URINE: NEGATIVE
CAST: 2 /LPF
COLOR: NORMAL
EPITHELIAL CELLS: 18 /HPF
GLUCOSE QUALITATIVE U: NEGATIVE MG/DL
KETONES URINE: NEGATIVE MG/DL
LEUKOCYTE ESTERASE URINE: ABNORMAL
MICROSCOPIC-UA: NORMAL
NITRITE URINE: NEGATIVE
PH URINE: 5.5
PROTEIN URINE: NORMAL MG/DL
RED BLOOD CELLS URINE: 3 /HPF
SPECIFIC GRAVITY URINE: 1.03
UROBILINOGEN URINE: 1 MG/DL
WHITE BLOOD CELLS URINE: 35 /HPF

## 2024-12-23 PROCEDURE — 36415 COLL VENOUS BLD VENIPUNCTURE: CPT

## 2024-12-23 PROCEDURE — 99395 PREV VISIT EST AGE 18-39: CPT

## 2024-12-23 RX ORDER — MULTIVITAMIN
TABLET ORAL
Refills: 0 | Status: ACTIVE | COMMUNITY

## 2024-12-23 RX ORDER — PNV NO.95/FERROUS FUM/FOLIC AC 28MG-0.8MG
TABLET ORAL
Refills: 0 | Status: ACTIVE | COMMUNITY

## 2024-12-23 RX ORDER — CALCIUM CARBONATE/VITAMIN D3 600 MG-10
TABLET ORAL
Refills: 0 | Status: ACTIVE | COMMUNITY

## 2024-12-23 RX ORDER — ASCORBIC ACID 500 MG
TABLET ORAL
Refills: 0 | Status: ACTIVE | COMMUNITY

## 2024-12-24 LAB
25(OH)D3 SERPL-MCNC: 35.6 NG/ML
FOLATE SERPL-MCNC: >20 NG/ML
VIT B12 SERPL-MCNC: 1722 PG/ML

## 2025-06-12 ENCOUNTER — EMERGENCY (EMERGENCY)
Facility: HOSPITAL | Age: 25
LOS: 1 days | End: 2025-06-12
Attending: EMERGENCY MEDICINE | Admitting: EMERGENCY MEDICINE
Payer: COMMERCIAL

## 2025-06-12 VITALS
SYSTOLIC BLOOD PRESSURE: 119 MMHG | DIASTOLIC BLOOD PRESSURE: 77 MMHG | HEART RATE: 74 BPM | RESPIRATION RATE: 16 BRPM | TEMPERATURE: 98 F | OXYGEN SATURATION: 100 %

## 2025-06-12 VITALS
DIASTOLIC BLOOD PRESSURE: 70 MMHG | HEART RATE: 68 BPM | TEMPERATURE: 98 F | WEIGHT: 160.06 LBS | HEIGHT: 68 IN | RESPIRATION RATE: 18 BRPM | SYSTOLIC BLOOD PRESSURE: 113 MMHG | OXYGEN SATURATION: 100 %

## 2025-06-12 LAB
ALBUMIN SERPL ELPH-MCNC: 3.8 G/DL — SIGNIFICANT CHANGE UP (ref 3.3–5)
ALP SERPL-CCNC: 46 U/L — SIGNIFICANT CHANGE UP (ref 40–120)
ALT FLD-CCNC: 17 U/L — SIGNIFICANT CHANGE UP (ref 12–78)
ANION GAP SERPL CALC-SCNC: 8 MMOL/L — SIGNIFICANT CHANGE UP (ref 5–17)
APPEARANCE UR: CLEAR — SIGNIFICANT CHANGE UP
AST SERPL-CCNC: 13 U/L — LOW (ref 15–37)
BASOPHILS # BLD AUTO: 0.03 K/UL — SIGNIFICANT CHANGE UP (ref 0–0.2)
BASOPHILS NFR BLD AUTO: 0.6 % — SIGNIFICANT CHANGE UP (ref 0–2)
BILIRUB SERPL-MCNC: 0.7 MG/DL — SIGNIFICANT CHANGE UP (ref 0.2–1.2)
BILIRUB UR-MCNC: NEGATIVE — SIGNIFICANT CHANGE UP
BUN SERPL-MCNC: 12 MG/DL — SIGNIFICANT CHANGE UP (ref 7–23)
CALCIUM SERPL-MCNC: 9 MG/DL — SIGNIFICANT CHANGE UP (ref 8.5–10.1)
CHLORIDE SERPL-SCNC: 110 MMOL/L — HIGH (ref 96–108)
CO2 SERPL-SCNC: 22 MMOL/L — SIGNIFICANT CHANGE UP (ref 22–31)
COLOR SPEC: YELLOW — SIGNIFICANT CHANGE UP
CREAT SERPL-MCNC: 0.67 MG/DL — SIGNIFICANT CHANGE UP (ref 0.5–1.3)
DIFF PNL FLD: NEGATIVE — SIGNIFICANT CHANGE UP
EGFR: 126 ML/MIN/1.73M2 — SIGNIFICANT CHANGE UP
EGFR: 126 ML/MIN/1.73M2 — SIGNIFICANT CHANGE UP
EOSINOPHIL # BLD AUTO: 0.18 K/UL — SIGNIFICANT CHANGE UP (ref 0–0.5)
EOSINOPHIL NFR BLD AUTO: 3.7 % — SIGNIFICANT CHANGE UP (ref 0–6)
EPI CELLS # UR: PRESENT
GLUCOSE SERPL-MCNC: 81 MG/DL — SIGNIFICANT CHANGE UP (ref 70–99)
GLUCOSE UR QL: NEGATIVE MG/DL — SIGNIFICANT CHANGE UP
HCG SERPL-ACNC: <1 MIU/ML — SIGNIFICANT CHANGE UP
HCT VFR BLD CALC: 36.5 % — SIGNIFICANT CHANGE UP (ref 34.5–45)
HGB BLD-MCNC: 12.3 G/DL — SIGNIFICANT CHANGE UP (ref 11.5–15.5)
IMM GRANULOCYTES NFR BLD AUTO: 0.4 % — SIGNIFICANT CHANGE UP (ref 0–0.9)
KETONES UR QL: NEGATIVE MG/DL — SIGNIFICANT CHANGE UP
LEUKOCYTE ESTERASE UR-ACNC: ABNORMAL
LIDOCAIN IGE QN: 37 U/L — SIGNIFICANT CHANGE UP (ref 13–75)
LYMPHOCYTES # BLD AUTO: 1.52 K/UL — SIGNIFICANT CHANGE UP (ref 1–3.3)
LYMPHOCYTES # BLD AUTO: 31 % — SIGNIFICANT CHANGE UP (ref 13–44)
MCHC RBC-ENTMCNC: 29.1 PG — SIGNIFICANT CHANGE UP (ref 27–34)
MCHC RBC-ENTMCNC: 33.7 G/DL — SIGNIFICANT CHANGE UP (ref 32–36)
MCV RBC AUTO: 86.3 FL — SIGNIFICANT CHANGE UP (ref 80–100)
MONOCYTES # BLD AUTO: 0.47 K/UL — SIGNIFICANT CHANGE UP (ref 0–0.9)
MONOCYTES NFR BLD AUTO: 9.6 % — SIGNIFICANT CHANGE UP (ref 2–14)
NEUTROPHILS # BLD AUTO: 2.68 K/UL — SIGNIFICANT CHANGE UP (ref 1.8–7.4)
NEUTROPHILS NFR BLD AUTO: 54.7 % — SIGNIFICANT CHANGE UP (ref 43–77)
NITRITE UR-MCNC: NEGATIVE — SIGNIFICANT CHANGE UP
NRBC BLD AUTO-RTO: 0 /100 WBCS — SIGNIFICANT CHANGE UP (ref 0–0)
PH UR: 5.5 — SIGNIFICANT CHANGE UP (ref 5–8)
PLATELET # BLD AUTO: 209 K/UL — SIGNIFICANT CHANGE UP (ref 150–400)
POTASSIUM SERPL-MCNC: 3.9 MMOL/L — SIGNIFICANT CHANGE UP (ref 3.5–5.3)
POTASSIUM SERPL-SCNC: 3.9 MMOL/L — SIGNIFICANT CHANGE UP (ref 3.5–5.3)
PROT SERPL-MCNC: 7.1 G/DL — SIGNIFICANT CHANGE UP (ref 6–8.3)
PROT UR-MCNC: NEGATIVE MG/DL — SIGNIFICANT CHANGE UP
RBC # BLD: 4.23 M/UL — SIGNIFICANT CHANGE UP (ref 3.8–5.2)
RBC # FLD: 12.6 % — SIGNIFICANT CHANGE UP (ref 10.3–14.5)
RBC CASTS # UR COMP ASSIST: 0 /HPF — SIGNIFICANT CHANGE UP (ref 0–4)
SODIUM SERPL-SCNC: 140 MMOL/L — SIGNIFICANT CHANGE UP (ref 135–145)
SP GR SPEC: 1.05 — HIGH (ref 1–1.03)
UROBILINOGEN FLD QL: 0.2 MG/DL — SIGNIFICANT CHANGE UP (ref 0.2–1)
WBC # BLD: 4.9 K/UL — SIGNIFICANT CHANGE UP (ref 3.8–10.5)
WBC # FLD AUTO: 4.9 K/UL — SIGNIFICANT CHANGE UP (ref 3.8–10.5)
WBC UR QL: 25 /HPF — HIGH (ref 0–5)

## 2025-06-12 PROCEDURE — 99284 EMERGENCY DEPT VISIT MOD MDM: CPT | Mod: 25

## 2025-06-12 PROCEDURE — 74177 CT ABD & PELVIS W/CONTRAST: CPT | Mod: 26

## 2025-06-12 PROCEDURE — 76830 TRANSVAGINAL US NON-OB: CPT

## 2025-06-12 PROCEDURE — 84702 CHORIONIC GONADOTROPIN TEST: CPT

## 2025-06-12 PROCEDURE — 87086 URINE CULTURE/COLONY COUNT: CPT

## 2025-06-12 PROCEDURE — 81001 URINALYSIS AUTO W/SCOPE: CPT

## 2025-06-12 PROCEDURE — 83690 ASSAY OF LIPASE: CPT

## 2025-06-12 PROCEDURE — 76830 TRANSVAGINAL US NON-OB: CPT | Mod: 26

## 2025-06-12 PROCEDURE — 36415 COLL VENOUS BLD VENIPUNCTURE: CPT

## 2025-06-12 PROCEDURE — 80053 COMPREHEN METABOLIC PANEL: CPT

## 2025-06-12 PROCEDURE — 85025 COMPLETE CBC W/AUTO DIFF WBC: CPT

## 2025-06-12 PROCEDURE — 99285 EMERGENCY DEPT VISIT HI MDM: CPT

## 2025-06-12 PROCEDURE — 74177 CT ABD & PELVIS W/CONTRAST: CPT

## 2025-06-12 RX ADMIN — Medication 1000 MILLILITER(S): at 10:01

## 2025-06-13 ENCOUNTER — NON-APPOINTMENT (OUTPATIENT)
Age: 25
End: 2025-06-13

## 2025-06-13 LAB
CULTURE RESULTS: SIGNIFICANT CHANGE UP
SPECIMEN SOURCE: SIGNIFICANT CHANGE UP

## 2025-07-08 ENCOUNTER — ASOB RESULT (OUTPATIENT)
Age: 25
End: 2025-07-08

## 2025-07-08 ENCOUNTER — APPOINTMENT (OUTPATIENT)
Dept: OBGYN | Facility: CLINIC | Age: 25
End: 2025-07-08
Payer: COMMERCIAL

## 2025-07-08 VITALS
BODY MASS INDEX: 23.7 KG/M2 | WEIGHT: 160 LBS | DIASTOLIC BLOOD PRESSURE: 80 MMHG | HEIGHT: 69 IN | SYSTOLIC BLOOD PRESSURE: 115 MMHG

## 2025-07-08 PROBLEM — N83.209 OVARIAN CYST RUPTURE: Status: RESOLVED | Noted: 2025-07-08 | Resolved: 2025-07-08

## 2025-07-08 PROBLEM — Z01.419 WOMEN'S ANNUAL ROUTINE GYNECOLOGICAL EXAMINATION: Status: ACTIVE | Noted: 2025-07-08

## 2025-07-08 PROBLEM — Z87.42 HISTORY OF OVARIAN CYST: Status: ACTIVE | Noted: 2025-07-08

## 2025-07-08 PROCEDURE — 76830 TRANSVAGINAL US NON-OB: CPT

## 2025-07-08 PROCEDURE — 36415 COLL VENOUS BLD VENIPUNCTURE: CPT

## 2025-07-08 PROCEDURE — 99385 PREV VISIT NEW AGE 18-39: CPT

## 2025-07-08 RX ORDER — DROSPIRENONE AND ETHINYL ESTRADIOL 0.02-3(28)
3-0.02 KIT ORAL DAILY
Qty: 90 | Refills: 3 | Status: ACTIVE | COMMUNITY
Start: 2025-07-08 | End: 1900-01-01

## 2025-07-09 LAB
HBV SURFACE AG SER QL: NONREACTIVE
HCV AB SER QL: NONREACTIVE
HCV S/CO RATIO: 0.16 S/CO
HIV1+2 AB SPEC QL IA.RAPID: NONREACTIVE
T PALLIDUM AB SER QL IA: NEGATIVE

## 2025-07-10 LAB
C TRACH RRNA SPEC QL NAA+PROBE: NOT DETECTED
N GONORRHOEA RRNA SPEC QL NAA+PROBE: NOT DETECTED
SOURCE AMPLIFICATION: NORMAL

## 2025-07-11 LAB — CYTOLOGY CVX/VAG DOC THIN PREP: ABNORMAL

## 2025-07-26 ENCOUNTER — TRANSCRIPTION ENCOUNTER (OUTPATIENT)
Age: 25
End: 2025-07-26

## 2025-08-18 ENCOUNTER — NON-APPOINTMENT (OUTPATIENT)
Age: 25
End: 2025-08-18

## 2025-09-11 ENCOUNTER — RX RENEWAL (OUTPATIENT)
Age: 25
End: 2025-09-11

## 2025-09-11 RX ORDER — DROSPIRENONE AND ETHINYL ESTRADIOL 0.02-3(28)
3-0.02 KIT ORAL
Qty: 90 | Refills: 3 | Status: ACTIVE | COMMUNITY
Start: 2025-09-11 | End: 1900-01-01